# Patient Record
Sex: MALE | URBAN - METROPOLITAN AREA
[De-identification: names, ages, dates, MRNs, and addresses within clinical notes are randomized per-mention and may not be internally consistent; named-entity substitution may affect disease eponyms.]

---

## 2020-06-01 ENCOUNTER — OUTPATIENT (OUTPATIENT)
Dept: OUTPATIENT SERVICES | Facility: HOSPITAL | Age: 65
LOS: 1 days | End: 2020-06-01
Payer: MEDICAID

## 2020-06-15 PROCEDURE — G9001: CPT

## 2020-06-27 ENCOUNTER — INPATIENT (INPATIENT)
Facility: HOSPITAL | Age: 65
LOS: 1 days | Discharge: HOME | DRG: 282 | End: 2020-06-29
Attending: HOSPITALIST | Admitting: HOSPITALIST
Payer: MEDICAID

## 2020-06-27 VITALS
DIASTOLIC BLOOD PRESSURE: 80 MMHG | HEIGHT: 66 IN | SYSTOLIC BLOOD PRESSURE: 137 MMHG | OXYGEN SATURATION: 97 % | WEIGHT: 169.98 LBS | RESPIRATION RATE: 18 BRPM | HEART RATE: 57 BPM | TEMPERATURE: 98 F

## 2020-06-27 LAB
ALBUMIN SERPL ELPH-MCNC: 4.1 G/DL — SIGNIFICANT CHANGE UP (ref 3.3–5.2)
ALP SERPL-CCNC: 66 U/L — SIGNIFICANT CHANGE UP (ref 40–120)
ALT FLD-CCNC: 16 U/L — SIGNIFICANT CHANGE UP
ANION GAP SERPL CALC-SCNC: 10 MMOL/L — SIGNIFICANT CHANGE UP (ref 5–17)
AST SERPL-CCNC: 31 U/L — SIGNIFICANT CHANGE UP
BASOPHILS # BLD AUTO: 0.06 K/UL — SIGNIFICANT CHANGE UP (ref 0–0.2)
BASOPHILS NFR BLD AUTO: 0.8 % — SIGNIFICANT CHANGE UP (ref 0–2)
BILIRUB SERPL-MCNC: 0.9 MG/DL — SIGNIFICANT CHANGE UP (ref 0.4–2)
BUN SERPL-MCNC: 16 MG/DL — SIGNIFICANT CHANGE UP (ref 8–20)
CALCIUM SERPL-MCNC: 8.8 MG/DL — SIGNIFICANT CHANGE UP (ref 8.6–10.2)
CHLORIDE SERPL-SCNC: 101 MMOL/L — SIGNIFICANT CHANGE UP (ref 98–107)
CO2 SERPL-SCNC: 25 MMOL/L — SIGNIFICANT CHANGE UP (ref 22–29)
CREAT SERPL-MCNC: 0.97 MG/DL — SIGNIFICANT CHANGE UP (ref 0.5–1.3)
D DIMER BLD IA.RAPID-MCNC: 155 NG/ML DDU — SIGNIFICANT CHANGE UP
EOSINOPHIL # BLD AUTO: 1.1 K/UL — HIGH (ref 0–0.5)
EOSINOPHIL NFR BLD AUTO: 14.4 % — HIGH (ref 0–6)
GLUCOSE SERPL-MCNC: 95 MG/DL — SIGNIFICANT CHANGE UP (ref 70–99)
HCT VFR BLD CALC: 40 % — SIGNIFICANT CHANGE UP (ref 39–50)
HGB BLD-MCNC: 13.1 G/DL — SIGNIFICANT CHANGE UP (ref 13–17)
IMM GRANULOCYTES NFR BLD AUTO: 0.1 % — SIGNIFICANT CHANGE UP (ref 0–1.5)
LIDOCAIN IGE QN: 30 U/L — SIGNIFICANT CHANGE UP (ref 22–51)
LYMPHOCYTES # BLD AUTO: 2.28 K/UL — SIGNIFICANT CHANGE UP (ref 1–3.3)
LYMPHOCYTES # BLD AUTO: 29.9 % — SIGNIFICANT CHANGE UP (ref 13–44)
MAGNESIUM SERPL-MCNC: 2.5 MG/DL — SIGNIFICANT CHANGE UP (ref 1.6–2.6)
MCHC RBC-ENTMCNC: 30 PG — SIGNIFICANT CHANGE UP (ref 27–34)
MCHC RBC-ENTMCNC: 32.8 GM/DL — SIGNIFICANT CHANGE UP (ref 32–36)
MCV RBC AUTO: 91.5 FL — SIGNIFICANT CHANGE UP (ref 80–100)
MONOCYTES # BLD AUTO: 0.68 K/UL — SIGNIFICANT CHANGE UP (ref 0–0.9)
MONOCYTES NFR BLD AUTO: 8.9 % — SIGNIFICANT CHANGE UP (ref 2–14)
NEUTROPHILS # BLD AUTO: 3.5 K/UL — SIGNIFICANT CHANGE UP (ref 1.8–7.4)
NEUTROPHILS NFR BLD AUTO: 45.9 % — SIGNIFICANT CHANGE UP (ref 43–77)
NT-PROBNP SERPL-SCNC: 622 PG/ML — HIGH (ref 0–300)
PLATELET # BLD AUTO: 244 K/UL — SIGNIFICANT CHANGE UP (ref 150–400)
POTASSIUM SERPL-MCNC: 4.3 MMOL/L — SIGNIFICANT CHANGE UP (ref 3.5–5.3)
POTASSIUM SERPL-SCNC: 4.3 MMOL/L — SIGNIFICANT CHANGE UP (ref 3.5–5.3)
PROT SERPL-MCNC: 7.1 G/DL — SIGNIFICANT CHANGE UP (ref 6.6–8.7)
RBC # BLD: 4.37 M/UL — SIGNIFICANT CHANGE UP (ref 4.2–5.8)
RBC # FLD: 13.1 % — SIGNIFICANT CHANGE UP (ref 10.3–14.5)
SODIUM SERPL-SCNC: 136 MMOL/L — SIGNIFICANT CHANGE UP (ref 135–145)
TROPONIN T SERPL-MCNC: 0.07 NG/ML — HIGH (ref 0–0.06)
WBC # BLD: 7.63 K/UL — SIGNIFICANT CHANGE UP (ref 3.8–10.5)
WBC # FLD AUTO: 7.63 K/UL — SIGNIFICANT CHANGE UP (ref 3.8–10.5)

## 2020-06-27 PROCEDURE — 99285 EMERGENCY DEPT VISIT HI MDM: CPT

## 2020-06-27 PROCEDURE — 71046 X-RAY EXAM CHEST 2 VIEWS: CPT | Mod: 26

## 2020-06-27 RX ORDER — ONDANSETRON 8 MG/1
4 TABLET, FILM COATED ORAL ONCE
Refills: 0 | Status: COMPLETED | OUTPATIENT
Start: 2020-06-27 | End: 2020-06-27

## 2020-06-27 RX ORDER — CLOPIDOGREL BISULFATE 75 MG/1
300 TABLET, FILM COATED ORAL ONCE
Refills: 0 | Status: COMPLETED | OUTPATIENT
Start: 2020-06-27 | End: 2020-06-27

## 2020-06-27 RX ORDER — FAMOTIDINE 10 MG/ML
20 INJECTION INTRAVENOUS ONCE
Refills: 0 | Status: COMPLETED | OUTPATIENT
Start: 2020-06-27 | End: 2020-06-27

## 2020-06-27 RX ORDER — ASPIRIN/CALCIUM CARB/MAGNESIUM 324 MG
324 TABLET ORAL ONCE
Refills: 0 | Status: COMPLETED | OUTPATIENT
Start: 2020-06-27 | End: 2020-06-27

## 2020-06-27 RX ADMIN — Medication 324 MILLIGRAM(S): at 23:36

## 2020-06-27 RX ADMIN — ONDANSETRON 4 MILLIGRAM(S): 8 TABLET, FILM COATED ORAL at 23:35

## 2020-06-27 RX ADMIN — FAMOTIDINE 20 MILLIGRAM(S): 10 INJECTION INTRAVENOUS at 23:36

## 2020-06-27 RX ADMIN — Medication 30 MILLILITER(S): at 23:35

## 2020-06-27 RX ADMIN — CLOPIDOGREL BISULFATE 300 MILLIGRAM(S): 75 TABLET, FILM COATED ORAL at 23:55

## 2020-06-27 NOTE — ED PROVIDER NOTE - CARE PLAN
Principal Discharge DX:	NSTEMI (non-ST elevated myocardial infarction)  Secondary Diagnosis:	Duodenitis

## 2020-06-27 NOTE — ED ADULT TRIAGE NOTE - CHIEF COMPLAINT QUOTE
skylar states that patient has chest pain x 1 week with a burning feeling and body aches, no cardiac HX

## 2020-06-27 NOTE — ED PROVIDER NOTE - OBJECTIVE STATEMENT
65yom w/ no PMH has been having chest pain for the last week. Kiswahili speaking,  #[id number] used. States he has been having a burning, non-radiating pain in the chest for the past week, initially coming and going, usually after meals, but today he was having more persistent pain. Denies any associated shortness of breath or dyspnea on exertion but does report decreased appetite and early satiety. No prior cardiac history. Has been taking antacids with no relief.

## 2020-06-27 NOTE — ED PROVIDER NOTE - CLINICAL SUMMARY MEDICAL DECISION MAKING FREE TEXT BOX
EPigastric and chest pain initially suggestive of GI origin such as gastritis or PUD but found to have positive troponin consistent with NSTEMI. Cardiology consulted, given aspirin and plavix loads and started on heparin for ACS. A CT of the abdomen was performed which showed non-specific thickening of the duodenal wall, possibly neoplastic, will be followed up inpatient.

## 2020-06-28 DIAGNOSIS — I21.4 NON-ST ELEVATION (NSTEMI) MYOCARDIAL INFARCTION: ICD-10-CM

## 2020-06-28 DIAGNOSIS — E03.9 HYPOTHYROIDISM, UNSPECIFIED: ICD-10-CM

## 2020-06-28 DIAGNOSIS — K27.9 PEPTIC ULCER, SITE UNSPECIFIED, UNSPECIFIED AS ACUTE OR CHRONIC, WITHOUT HEMORRHAGE OR PERFORATION: ICD-10-CM

## 2020-06-28 LAB
A1C WITH ESTIMATED AVERAGE GLUCOSE RESULT: 5.7 % — HIGH (ref 4–5.6)
ALBUMIN SERPL ELPH-MCNC: 3.9 G/DL — SIGNIFICANT CHANGE UP (ref 3.3–5.2)
ALP SERPL-CCNC: 64 U/L — SIGNIFICANT CHANGE UP (ref 40–120)
ALT FLD-CCNC: 15 U/L — SIGNIFICANT CHANGE UP
ANION GAP SERPL CALC-SCNC: 11 MMOL/L — SIGNIFICANT CHANGE UP (ref 5–17)
APTT BLD: 107.5 SEC — HIGH (ref 27.5–36.3)
APTT BLD: 37.7 SEC — HIGH (ref 27.5–36.3)
APTT BLD: 55.6 SEC — HIGH (ref 27.5–36.3)
APTT BLD: 58.6 SEC — HIGH (ref 27.5–36.3)
AST SERPL-CCNC: 30 U/L — SIGNIFICANT CHANGE UP
BILIRUB SERPL-MCNC: 1 MG/DL — SIGNIFICANT CHANGE UP (ref 0.4–2)
BUN SERPL-MCNC: 14 MG/DL — SIGNIFICANT CHANGE UP (ref 8–20)
CALCIUM SERPL-MCNC: 9.1 MG/DL — SIGNIFICANT CHANGE UP (ref 8.6–10.2)
CHLORIDE SERPL-SCNC: 102 MMOL/L — SIGNIFICANT CHANGE UP (ref 98–107)
CHOLEST SERPL-MCNC: 128 MG/DL — SIGNIFICANT CHANGE UP (ref 110–199)
CK MB CFR SERPL CALC: 10.3 NG/ML — HIGH (ref 0–6.7)
CK SERPL-CCNC: 269 U/L — HIGH (ref 30–200)
CO2 SERPL-SCNC: 25 MMOL/L — SIGNIFICANT CHANGE UP (ref 22–29)
CREAT SERPL-MCNC: 0.95 MG/DL — SIGNIFICANT CHANGE UP (ref 0.5–1.3)
ESTIMATED AVERAGE GLUCOSE: 117 MG/DL — HIGH (ref 68–114)
GLUCOSE SERPL-MCNC: 97 MG/DL — SIGNIFICANT CHANGE UP (ref 70–99)
HCT VFR BLD CALC: 39.5 % — SIGNIFICANT CHANGE UP (ref 39–50)
HDLC SERPL-MCNC: 32 MG/DL — LOW
HGB BLD-MCNC: 13.2 G/DL — SIGNIFICANT CHANGE UP (ref 13–17)
INR BLD: 1.09 RATIO — SIGNIFICANT CHANGE UP (ref 0.88–1.16)
LIPID PNL WITH DIRECT LDL SERPL: 86 MG/DL — SIGNIFICANT CHANGE UP
MAGNESIUM SERPL-MCNC: 2.5 MG/DL — SIGNIFICANT CHANGE UP (ref 1.6–2.6)
MCHC RBC-ENTMCNC: 30.5 PG — SIGNIFICANT CHANGE UP (ref 27–34)
MCHC RBC-ENTMCNC: 33.4 GM/DL — SIGNIFICANT CHANGE UP (ref 32–36)
MCV RBC AUTO: 91.2 FL — SIGNIFICANT CHANGE UP (ref 80–100)
PHOSPHATE SERPL-MCNC: 2.8 MG/DL — SIGNIFICANT CHANGE UP (ref 2.4–4.7)
PLATELET # BLD AUTO: 216 K/UL — SIGNIFICANT CHANGE UP (ref 150–400)
POTASSIUM SERPL-MCNC: 4.2 MMOL/L — SIGNIFICANT CHANGE UP (ref 3.5–5.3)
POTASSIUM SERPL-SCNC: 4.2 MMOL/L — SIGNIFICANT CHANGE UP (ref 3.5–5.3)
PROT SERPL-MCNC: 6.7 G/DL — SIGNIFICANT CHANGE UP (ref 6.6–8.7)
PROTHROM AB SERPL-ACNC: 12.3 SEC — SIGNIFICANT CHANGE UP (ref 10–12.9)
RBC # BLD: 4.33 M/UL — SIGNIFICANT CHANGE UP (ref 4.2–5.8)
RBC # FLD: 13 % — SIGNIFICANT CHANGE UP (ref 10.3–14.5)
SARS-COV-2 IGG SERPL QL IA: NEGATIVE — SIGNIFICANT CHANGE UP
SARS-COV-2 IGM SERPL IA-ACNC: 0.11 INDEX — SIGNIFICANT CHANGE UP
SARS-COV-2 RNA SPEC QL NAA+PROBE: SIGNIFICANT CHANGE UP
SODIUM SERPL-SCNC: 138 MMOL/L — SIGNIFICANT CHANGE UP (ref 135–145)
T3FREE SERPL-MCNC: 2.88 PG/ML — SIGNIFICANT CHANGE UP (ref 1.8–4.6)
T4 FREE SERPL-MCNC: 1 NG/DL — SIGNIFICANT CHANGE UP (ref 0.9–1.8)
TOTAL CHOLESTEROL/HDL RATIO MEASUREMENT: 4 RATIO — SIGNIFICANT CHANGE UP (ref 3.4–9.6)
TRIGL SERPL-MCNC: 52 MG/DL — SIGNIFICANT CHANGE UP (ref 10–200)
TROPONIN T SERPL-MCNC: 0.07 NG/ML — HIGH (ref 0–0.06)
TROPONIN T SERPL-MCNC: 0.08 NG/ML — HIGH (ref 0–0.06)
TROPONIN T SERPL-MCNC: 0.1 NG/ML — HIGH (ref 0–0.06)
TSH SERPL-MCNC: 17.94 UIU/ML — HIGH (ref 0.27–4.2)
WBC # BLD: 6.82 K/UL — SIGNIFICANT CHANGE UP (ref 3.8–10.5)
WBC # FLD AUTO: 6.82 K/UL — SIGNIFICANT CHANGE UP (ref 3.8–10.5)

## 2020-06-28 PROCEDURE — 93010 ELECTROCARDIOGRAM REPORT: CPT | Mod: 76

## 2020-06-28 PROCEDURE — 99223 1ST HOSP IP/OBS HIGH 75: CPT

## 2020-06-28 PROCEDURE — 74177 CT ABD & PELVIS W/CONTRAST: CPT | Mod: 26

## 2020-06-28 PROCEDURE — 93306 TTE W/DOPPLER COMPLETE: CPT | Mod: 26

## 2020-06-28 PROCEDURE — 12345: CPT | Mod: NC

## 2020-06-28 RX ORDER — HEPARIN SODIUM 5000 [USP'U]/ML
4100 INJECTION INTRAVENOUS; SUBCUTANEOUS ONCE
Refills: 0 | Status: COMPLETED | OUTPATIENT
Start: 2020-06-28 | End: 2020-06-28

## 2020-06-28 RX ORDER — LISINOPRIL 2.5 MG/1
2.5 TABLET ORAL DAILY
Refills: 0 | Status: DISCONTINUED | OUTPATIENT
Start: 2020-06-28 | End: 2020-06-29

## 2020-06-28 RX ORDER — NITROGLYCERIN 6.5 MG
0.4 CAPSULE, EXTENDED RELEASE ORAL
Refills: 0 | Status: DISCONTINUED | OUTPATIENT
Start: 2020-06-28 | End: 2020-06-29

## 2020-06-28 RX ORDER — CARVEDILOL PHOSPHATE 80 MG/1
3.12 CAPSULE, EXTENDED RELEASE ORAL EVERY 12 HOURS
Refills: 0 | Status: DISCONTINUED | OUTPATIENT
Start: 2020-06-28 | End: 2020-06-29

## 2020-06-28 RX ORDER — HEPARIN SODIUM 5000 [USP'U]/ML
INJECTION INTRAVENOUS; SUBCUTANEOUS
Qty: 25000 | Refills: 0 | Status: DISCONTINUED | OUTPATIENT
Start: 2020-06-28 | End: 2020-06-29

## 2020-06-28 RX ORDER — SODIUM CHLORIDE 9 MG/ML
3 INJECTION INTRAMUSCULAR; INTRAVENOUS; SUBCUTANEOUS EVERY 8 HOURS
Refills: 0 | Status: DISCONTINUED | OUTPATIENT
Start: 2020-06-28 | End: 2020-06-29

## 2020-06-28 RX ORDER — ACETAMINOPHEN 500 MG
650 TABLET ORAL ONCE
Refills: 0 | Status: COMPLETED | OUTPATIENT
Start: 2020-06-28 | End: 2020-06-28

## 2020-06-28 RX ORDER — ASPIRIN/CALCIUM CARB/MAGNESIUM 324 MG
81 TABLET ORAL DAILY
Refills: 0 | Status: DISCONTINUED | OUTPATIENT
Start: 2020-06-28 | End: 2020-06-29

## 2020-06-28 RX ORDER — LIDOCAINE 4 G/100G
1 CREAM TOPICAL DAILY
Refills: 0 | Status: DISCONTINUED | OUTPATIENT
Start: 2020-06-28 | End: 2020-06-29

## 2020-06-28 RX ORDER — PANTOPRAZOLE SODIUM 20 MG/1
40 TABLET, DELAYED RELEASE ORAL
Refills: 0 | Status: DISCONTINUED | OUTPATIENT
Start: 2020-06-28 | End: 2020-06-29

## 2020-06-28 RX ORDER — HEPARIN SODIUM 5000 [USP'U]/ML
4100 INJECTION INTRAVENOUS; SUBCUTANEOUS EVERY 6 HOURS
Refills: 0 | Status: DISCONTINUED | OUTPATIENT
Start: 2020-06-28 | End: 2020-06-29

## 2020-06-28 RX ORDER — ONDANSETRON 8 MG/1
4 TABLET, FILM COATED ORAL EVERY 6 HOURS
Refills: 0 | Status: DISCONTINUED | OUTPATIENT
Start: 2020-06-28 | End: 2020-06-29

## 2020-06-28 RX ADMIN — SODIUM CHLORIDE 3 MILLILITER(S): 9 INJECTION INTRAMUSCULAR; INTRAVENOUS; SUBCUTANEOUS at 05:16

## 2020-06-28 RX ADMIN — HEPARIN SODIUM 0 UNIT(S)/HR: 5000 INJECTION INTRAVENOUS; SUBCUTANEOUS at 08:21

## 2020-06-28 RX ADMIN — HEPARIN SODIUM 4100 UNIT(S): 5000 INJECTION INTRAVENOUS; SUBCUTANEOUS at 00:39

## 2020-06-28 RX ADMIN — PANTOPRAZOLE SODIUM 40 MILLIGRAM(S): 20 TABLET, DELAYED RELEASE ORAL at 05:14

## 2020-06-28 RX ADMIN — HEPARIN SODIUM 600 UNIT(S)/HR: 5000 INJECTION INTRAVENOUS; SUBCUTANEOUS at 09:25

## 2020-06-28 RX ADMIN — Medication 81 MILLIGRAM(S): at 08:22

## 2020-06-28 RX ADMIN — SODIUM CHLORIDE 3 MILLILITER(S): 9 INJECTION INTRAMUSCULAR; INTRAVENOUS; SUBCUTANEOUS at 20:55

## 2020-06-28 RX ADMIN — LISINOPRIL 2.5 MILLIGRAM(S): 2.5 TABLET ORAL at 05:14

## 2020-06-28 RX ADMIN — Medication 650 MILLIGRAM(S): at 20:56

## 2020-06-28 RX ADMIN — SODIUM CHLORIDE 3 MILLILITER(S): 9 INJECTION INTRAMUSCULAR; INTRAVENOUS; SUBCUTANEOUS at 14:06

## 2020-06-28 RX ADMIN — HEPARIN SODIUM 600 UNIT(S)/HR: 5000 INJECTION INTRAVENOUS; SUBCUTANEOUS at 17:07

## 2020-06-28 RX ADMIN — Medication 1 TABLET(S): at 08:22

## 2020-06-28 RX ADMIN — HEPARIN SODIUM 600 UNIT(S)/HR: 5000 INJECTION INTRAVENOUS; SUBCUTANEOUS at 23:54

## 2020-06-28 RX ADMIN — HEPARIN SODIUM 800 UNIT(S)/HR: 5000 INJECTION INTRAVENOUS; SUBCUTANEOUS at 00:39

## 2020-06-28 RX ADMIN — CARVEDILOL PHOSPHATE 3.12 MILLIGRAM(S): 80 CAPSULE, EXTENDED RELEASE ORAL at 17:08

## 2020-06-28 NOTE — PROGRESS NOTE ADULT - ASSESSMENT
65y/oM with ACS, likely PUD, elevated TSH    NSTEMI  -on heparin gtt  -s/p asa 325, plavix 300 in ER  -cont asa  -cont coreg  -TTE pending  -cardio following  -plan for cath 6/29  -Lipid panel: Tchol 128, LDL 86, HDL 32, TG 52  -A1c 5.7%    PUD  -CT: evidence of duodenitis vs malignancy  -monitor for GIB, follow H/H   -cont PPI    Hypothyroid   -holding off on initiating synthroid until after LHC

## 2020-06-28 NOTE — H&P ADULT - NSHPSOCIALHISTORY_GEN_ALL_CORE
Ride right d/S    
Fhx: Unable to obtain at this time    Former smoker 20 pack year, quit 5 years ago  no etoh  no illicit drugs

## 2020-06-28 NOTE — CONSULT NOTE ADULT - SUBJECTIVE AND OBJECTIVE BOX
[facility]                                                               Office:  [address] [address]                                                              Telephone: [phone]. Fax:[phone]                                                                        CARDIOLOGY CONSULTATION NOTE                                                                                               History obtained by: Patient and medical record   obtained: No    Chief complaint:    Patient is a 65y old  Male who presents with a chief complaint of chest pain      HPI: 66 yo M with no significant PMH, Ex- smoker ( smoked x 20 yrs, pack lasting x 1 week, quit 5 yrs ago) who presented with chest pain since 1 mos ago, with worsening in intensity over past 2  days. Pt describes pain as burning, worse after eating, and sometimes with exertion. Pt states that pain is anterior, non radiating, non pleuritic. Pt states that pain is somewhat relieved with antacids. Pt also c/o constipation. + nausea, intermittent. + early satiety. Pt denies any CP at the present time. Pt denies any SOB/dizziness/syncope/ diaphoresis/palpitations/F/C/cough/sick contacts/recent travelling/other complaints.         REVIEW OF SYMPTOMS:     CONSTITUTIONAL: No fever, weight loss, or fatigue  ENMT:  No difficulty hearing, tinnitus, vertigo; No sinus or throat pain  NECK: No pain or stiffness  CARDIOVASCULAR: + chest pain, NO dyspnea/ syncope/palpitations/ dizziness/ Orthopnea/ Paroxysmal nocturnal dyspnea  RESPIRATORY: No Dyspnea on exertion, Shortness of breath, cough, wheezing  : No dysuria, no hematuria   GI: + intermittent nausea, + early satiety. + constipation. No dark color stool, no melena, no diarrhea, no abdominal pain   NEURO: No headache, no dizziness, no slurred speech   MUSCULOSKELETAL: No joint pain or swelling; No muscle, back, or extremity pain  PSYCH: No agitation, no anxiety.    ALL OTHER REVIEW OF SYSTEMS ARE NEGATIVE.      PREVIOUS DIAGNOSTIC TESTING: None       ALLERGIES: No Known Allergies    Intolerances: NOne    PAST MEDICAL HISTORY: None      PAST SURGICAL HISTORY: None       FAMILY HISTORY:      SOCIAL HISTORY:    CIGARETTES:  1 pack lasting 1 week x 20yrs, quit 5 yrs ago,   Denies alcohol/drugs      CURRENT MEDICATIONS: ASA, Plavix    heparin   Injectable  heparin  Infusion.        HOME MEDICATIONS:  _Maalox PRN      Vital Signs Last 24 Hrs  T(C): 36.8 (27 Jun 2020 22:05), Max: 36.8 (27 Jun 2020 22:05)  T(F): 98.2 (27 Jun 2020 22:05), Max: 98.2 (27 Jun 2020 22:05)  HR: 56 (27 Jun 2020 23:45) (56 - 57)  BP: 143/81 (27 Jun 2020 23:45) (137/80 - 143/81)  BP(mean): 107 (27 Jun 2020 23:45) (107 - 107)  RR: 16 (27 Jun 2020 23:45) (16 - 18)  SpO2: 99% (27 Jun 2020 23:45) (97% - 99%)      PHYSICAL EXAM:  Constitutional: Comfortable. No acute distress.   HEENT: Atraumatic and normocephalic, neck is supple. No JVD. No carotid bruit. PEERL   CNS: A&Ox3. No focal deficits. EOMI. Cranial nerves II-IX are intact.   Lymph Nodes: Cervical: Not palpable.  Respiratory: CTA B/L   Cardiovascular: S1S2 RRR. No murmur/rubs or gallop.  Gastrointestinal: Soft non-tender and non distended. +Bowel sounds. Negative Valle's sign.  Extremities: No edema.   Psychiatric: Calm. No agitation.  Skin: No skin rash/ulcers visualized to face, hands or feet.    Intake and output: strict-- monitor     LABS:                        13.1   7.63  )-----------( 244      ( 27 Jun 2020 22:43 )             40.0     06-27    136  |  101  |  16.0  ----------------------------<  95  4.3   |  25.0  |  0.97    Ca    8.8      27 Jun 2020 22:43  Mg     2.5     06-27    TPro  7.1  /  Alb  4.1  /  TBili  0.9  /  DBili  x   /  AST  31  /  ALT  16  /  AlkPhos  66  06-27    CARDIAC MARKERS ( 27 Jun 2020 22:43 )  x     / 0.07 ng/mL / x     / x     / x        ;p-BNP=Serum Pro-Brain Natriuretic Peptide: 622 pg/mL (06-27 @ 22:43)    PT/INR - ( 27 Jun 2020 23:49 )   PT: 12.3 sec;   INR: 1.09 ratio         PTT - ( 27 Jun 2020 23:49 )  PTT:37.7 sec      INTERPRETATION OF TELEMETRY: Reviewed by me.   ECG: Reviewed by me.     RADIOLOGY & ADDITIONAL STUDIES:    X-ray:  reviewed by me.     CT scan:   MRI: [facility]                                                               Office:  [address] [address]                                                              Telephone: [phone]. Fax:[phone]                                                                        CARDIOLOGY CONSULTATION NOTE                                                                                               History obtained by: Patient and medical record   obtained: No    Chief complaint:    Patient is a 65y old  Male who presents with a chief complaint of chest pain      HPI: 66 yo M with no significant PMH, Ex- smoker ( smoked x 20 yrs, pack lasting x 1 week, quit 5 yrs ago) who presented with chest pain since 1 mos ago, with worsening in intensity over past 2  days. Pt describes pain as burning, worse after eating, and sometimes with exertion. Pt states that pain is anterior, non radiating, non pleuritic. Pt states that pain is somewhat relieved with antacids. Pt also c/o constipation. + nausea, intermittent. + early satiety. Pt denies any CP at the present time. Pt denies any SOB/dizziness/syncope/ diaphoresis/palpitations/F/C/cough/sick contacts/recent travelling/other complaints.         REVIEW OF SYMPTOMS:     CONSTITUTIONAL: No fever, weight loss, or fatigue  ENMT:  No difficulty hearing, tinnitus, vertigo; No sinus or throat pain  NECK: No pain or stiffness  CARDIOVASCULAR: + chest pain, NO dyspnea/ syncope/palpitations/ dizziness/ Orthopnea/ Paroxysmal nocturnal dyspnea  RESPIRATORY: No Dyspnea on exertion, Shortness of breath, cough, wheezing  : No dysuria, no hematuria   GI: + intermittent nausea, + early satiety. + constipation. No dark color stool, no melena, no diarrhea, no abdominal pain   NEURO: No headache, no dizziness, no slurred speech   MUSCULOSKELETAL: No joint pain or swelling; No muscle, back, or extremity pain  PSYCH: No agitation, no anxiety.    ALL OTHER REVIEW OF SYSTEMS ARE NEGATIVE.      PREVIOUS DIAGNOSTIC TESTING: None       ALLERGIES: No Known Allergies    Intolerances: NOne    PAST MEDICAL HISTORY: None      PAST SURGICAL HISTORY: None       FAMILY HISTORY:      SOCIAL HISTORY:    CIGARETTES:  1 pack lasting 1 week x 20yrs, quit 5 yrs ago,   Denies alcohol/drugs      CURRENT MEDICATIONS: ASA, Plavix    heparin   Injectable  heparin  Infusion.        HOME MEDICATIONS:  _Maalox PRN      Vital Signs Last 24 Hrs  T(C): 36.8 (27 Jun 2020 22:05), Max: 36.8 (27 Jun 2020 22:05)  T(F): 98.2 (27 Jun 2020 22:05), Max: 98.2 (27 Jun 2020 22:05)  HR: 56 (27 Jun 2020 23:45) (56 - 57)  BP: 143/81 (27 Jun 2020 23:45) (137/80 - 143/81)  BP(mean): 107 (27 Jun 2020 23:45) (107 - 107)  RR: 16 (27 Jun 2020 23:45) (16 - 18)  SpO2: 99% (27 Jun 2020 23:45) (97% - 99%)      PHYSICAL EXAM:  Constitutional: Comfortable. No acute distress.   HEENT: Atraumatic and normocephalic, neck is supple. No JVD. No carotid bruit. PEERL   CNS: A&Ox3. No focal deficits. EOMI. Cranial nerves II-IX are intact.   Lymph Nodes: Cervical: Not palpable.  Respiratory: CTA B/L   Cardiovascular: S1S2 RRR. No murmur/rubs or gallop.  Gastrointestinal: Soft non-tender and non distended. +Bowel sounds. Negative Valle's sign.  Extremities: No edema.   Psychiatric: Calm. No agitation.  Skin: No skin rash/ulcers visualized to face, hands or feet.    Intake and output: strict-- monitor     LABS:                        13.1   7.63  )-----------( 244      ( 27 Jun 2020 22:43 )             40.0     06-27    136  |  101  |  16.0  ----------------------------<  95  4.3   |  25.0  |  0.97    Ca    8.8      27 Jun 2020 22:43  Mg     2.5     06-27    TPro  7.1  /  Alb  4.1  /  TBili  0.9  /  DBili  x   /  AST  31  /  ALT  16  /  AlkPhos  66  06-27    CARDIAC MARKERS ( 27 Jun 2020 22:43 )  x     / 0.07 ng/mL / x     / x     / x        ;p-BNP=Serum Pro-Brain Natriuretic Peptide: 622 pg/mL (06-27 @ 22:43)    PT/INR - ( 27 Jun 2020 23:49 )   PT: 12.3 sec;   INR: 1.09 ratio         PTT - ( 27 Jun 2020 23:49 )  PTT:37.7 sec      INTERPRETATION OF TELEMETRY: Reviewed by me. -- sinus bradycardia   ECG: Reviewed by me. -- sinus perla at 57 bpm, TWI- AVR, III, V1, V2, early repolarization abnormality- AVL, QT's- 402     RADIOLOGY & ADDITIONAL STUDIES:    X-ray:  reviewed by me.     CT scan:   MRI: [place] [facility]                                                               Office:  [address] [address]                                                              Telephone: [phone]. Fax:[phone]                                                                        CARDIOLOGY CONSULTATION NOTE                                                                                               History obtained by: Patient and medical record   obtained: No    Chief complaint:    Patient is a 65y old  Male who presents with a chief complaint of chest pain      HPI: 64 yo M with no significant PMH, Ex- smoker ( smoked x 20 yrs, pack lasting x 1 week, quit 5 yrs ago) who presented with chest pain since 1 mos ago, with worsening in intensity over past 2  days. Pt describes pain as burning, worse after eating, and sometimes with exertion. Pt states that pain is anterior, non radiating, non pleuritic. Pt states that pain is somewhat relieved with antacids. Pt also c/o constipation. + nausea, intermittent. + early satiety. Pt denies any CP at the present time. Pt denies any SOB/dizziness/syncope/ diaphoresis/palpitations/F/C/cough/sick contacts/recent travelling/other complaints.         REVIEW OF SYMPTOMS:     CONSTITUTIONAL: No fever, weight loss, or fatigue  ENMT:  No difficulty hearing, tinnitus, vertigo; No sinus or throat pain  NECK: No pain or stiffness  CARDIOVASCULAR: + chest pain, NO dyspnea/ syncope/palpitations/ dizziness/ Orthopnea/ Paroxysmal nocturnal dyspnea  RESPIRATORY: No Dyspnea on exertion, Shortness of breath, cough, wheezing  : No dysuria, no hematuria   GI: + intermittent nausea, + early satiety. + constipation. No dark color stool, no melena, no diarrhea, no abdominal pain   NEURO: No headache, no dizziness, no slurred speech   MUSCULOSKELETAL: No joint pain or swelling; No muscle, back, or extremity pain  PSYCH: No agitation, no anxiety.    ALL OTHER REVIEW OF SYSTEMS ARE NEGATIVE.      PREVIOUS DIAGNOSTIC TESTING: None       ALLERGIES: No Known Allergies    Intolerances: NOne    PAST MEDICAL HISTORY: None      PAST SURGICAL HISTORY: None       FAMILY HISTORY:      SOCIAL HISTORY:    CIGARETTES:  1 pack lasting 1 week x 20yrs, quit 5 yrs ago,   Denies alcohol/drugs      CURRENT MEDICATIONS: ASA, Plavix    heparin   Injectable  heparin  Infusion.        HOME MEDICATIONS:  _Maalox PRN      Vital Signs Last 24 Hrs  T(C): 36.8 (27 Jun 2020 22:05), Max: 36.8 (27 Jun 2020 22:05)  T(F): 98.2 (27 Jun 2020 22:05), Max: 98.2 (27 Jun 2020 22:05)  HR: 56 (27 Jun 2020 23:45) (56 - 57)  BP: 143/81 (27 Jun 2020 23:45) (137/80 - 143/81)  BP(mean): 107 (27 Jun 2020 23:45) (107 - 107)  RR: 16 (27 Jun 2020 23:45) (16 - 18)  SpO2: 99% (27 Jun 2020 23:45) (97% - 99%)      PHYSICAL EXAM:  Constitutional: Comfortable. No acute distress.   HEENT: Atraumatic and normocephalic, neck is supple. No JVD. No carotid bruit. PEERL   CNS: A&Ox3. No focal deficits. EOMI. Cranial nerves II-IX are intact.   Lymph Nodes: Cervical: Not palpable.  Respiratory: CTA B/L   Cardiovascular: S1S2 RRR. No murmur/rubs or gallop.  Gastrointestinal: Soft non-tender and non distended. +Bowel sounds. Negative Valle's sign.  Extremities: No edema.   Psychiatric: Calm. No agitation.  Skin: No skin rash/ulcers visualized to face, hands or feet.    Intake and output: strict-- monitor     LABS:                        13.1   7.63  )-----------( 244      ( 27 Jun 2020 22:43 )             40.0     06-27    136  |  101  |  16.0  ----------------------------<  95  4.3   |  25.0  |  0.97    Ca    8.8      27 Jun 2020 22:43  Mg     2.5     06-27    TPro  7.1  /  Alb  4.1  /  TBili  0.9  /  DBili  x   /  AST  31  /  ALT  16  /  AlkPhos  66  06-27    CARDIAC MARKERS ( 27 Jun 2020 22:43 )  x     / 0.07 ng/mL / x     / x     / x        ;p-BNP=Serum Pro-Brain Natriuretic Peptide: 622 pg/mL (06-27 @ 22:43)    PT/INR - ( 27 Jun 2020 23:49 )   PT: 12.3 sec;   INR: 1.09 ratio         PTT - ( 27 Jun 2020 23:49 )  PTT:37.7 sec      INTERPRETATION OF TELEMETRY: Reviewed by me. -- sinus bradycardia   ECG: Reviewed by me. -- sinus perla at 57 bpm, TWI- AVR, III, V1, V2, early repolarization abnormality- AVL, QT's- 402     RADIOLOGY & ADDITIONAL STUDIES:    X-ray:  reviewed by me.     TTE-- ordered, pending    CT scan a/p -- Apparent wall thickening of the duodenal second portion. Recommend endoscopy to exclude inflammatory or neoplastic etiology.

## 2020-06-28 NOTE — CONSULT NOTE ADULT - ASSESSMENT
64 yo M with no significant PMH, Ex- smoker ( smoked x 20 yrs, pack lasting x 1 week, quit 5 yrs ago) who presented with chest pain since 1 mos ago, with worsening in intensity over past 2  days. 66 yo M with no significant PMH, Ex- smoker ( smoked x 20 yrs, pack lasting x 1 week, quit 5 yrs ago) who presented with chest pain since 1 mos ago, with worsening in intensity over past 2  days.    #Chest pain, R/o ACS  - Trop-- 0.07. >> serial CE - trend until peak, with CK-- next one ordered for 02:45 am,   -- ProBNP- 622,   --ECG - sinus perla 57 with TWI- AVR, III, V1 and V x 2 in ER (no prior ECG to compare with >> serial ECG's   -- tele monitoring,   -- TTE, pt will need further ischemic evaluation, possibly NST inpt versus outpt,   --  mg x 1 followed by 81 mg daily and Plavix 300 mg x 1 followed by 75 mg daily, Heparin gtt with bolus as per ACS nomogram,   -- bedrest x 24 hrs,   -- A1C, FLP, TSH,   -- CT a/p with oral contrast to r/o abdominal abnormalities     #COVID PCR-- negative, low suspicion for COVID,    #Ex-smoker    #Dispo/social issues:  -- to the tele floor  -- pt will need outpt f/u with PCP and possibly cardiology. Pt arrived from [facility] 8 mos ago and has not been following with PCP 66 yo M with no significant PMH, Ex- smoker ( smoked x 20 yrs, pack lasting x 1 week, quit 5 yrs ago) who presented with chest pain since 1 mos ago, with worsening in intensity over past 2  days.    #Chest pain, R/o ACS  - Trop-- 0.07. >> serial CE - trend until peak, with CK-- next one ordered for 02:45 am,   -- ProBNP- 622,   --ECG - sinus perla 57 with TWI- AVR, III, V1 and V x 2 in ER (no prior ECG to compare with >> serial ECG's   -- tele monitoring,   -- TTE to R/O structural heart dz and evaluate EF, pt will need further ischemic evaluation, possibly NST inpt versus outpt,   --  mg x 1 followed by 81 mg daily and Plavix 300 mg x 1 followed by 75 mg daily, Heparin gtt with bolus as per ACS nomogram,   -- bedrest x 24 hrs,   -- BP is mildly elevated, -- will start on low dose of Coreg for early treatment and secondary prevention,   -- A1C, FLP, TSH,   -- CT a/p with oral contrast to r/o abdominal abnormalities     #COVID PCR-- negative, low suspicion for COVID,    #Ex-smoker    #Dispo/social issues:  -- to the tele floor  -- pt will need outpt f/u with PCP and possibly cardiology. Pt arrived from [facility] 8 mos ago and has not been following with PCP 66 yo M with no significant PMH, Ex- smoker ( smoked x 20 yrs, pack lasting x 1 week, quit 5 yrs ago) who presented with chest pain since 1 mos ago, with worsening in intensity over past 2  days.    #Chest pain, R/o ACS  - Trop-- 0.07. >> serial CE - trend until peak, with CK-- next one ordered for 02:45 am,   -- ProBNP- 622,   --ECG - sinus perla 57 with TWI- AVR, III, V1 and V x 2 in ER (no prior ECG to compare with >> serial ECG's   -- tele monitoring,   -- TTE to R/O structural heart dz and evaluate EF, pt will need further ischemic evaluation, possibly NST inpt versus outpt,   --  mg x 1 followed by 81 mg daily and Plavix 300 mg x 1 followed by 75 mg daily, Heparin gtt with bolus as per ACS nomogram,   -- bedrest x 24 hrs,   -- BP is mildly elevated, -- started on low dose of Coreg for early treatment and secondary prevention,   -- A1C, FLP, TSH,   -- CT a/p with oral contrast to r/o abdominal abnormalities     #COVID PCR-- negative, low suspicion for COVID,    #Ex-smoker    #Dispo/social issues:  -- to the tele floor  -- pt will need outpt f/u with PCP and possibly cardiology. Pt arrived from [facility] 8 mos ago and has not been following with PCP 64 yo M with no significant PMH, Ex- smoker ( smoked x 20 yrs, pack lasting x 1 week, quit 5 yrs ago) who presented with chest pain since 1 mos ago, with worsening in intensity over past 2  days.    #Chest pain, R/o ACS  - Trop-- 0.07. >> serial CE - trend until peak, with CK-- next one ordered for 02:45 am,   -- ProBNP- 622,   --ECG - sinus perla 57 with TWI- AVR, III, V1 and V x 2 in ER (no prior ECG to compare with >> serial ECG's   -- tele monitoring,   -- TTE to R/O structural heart dz and evaluate EF, pt will need further ischemic evaluation, possibly NST inpt versus outpt,   --  mg x 1 followed by 81 mg daily and Plavix 300 mg x 1 followed by 75 mg daily, Heparin gtt with bolus as per ACS nomogram,   -- bedrest x 24 hrs,   -- BP is mildly elevated, -- started on low dose of Coreg for early treatment and secondary prevention,   -- A1C, FLP, TSH,     #Anterior chest pain with early satiety, r/o referred pain from the abdomen  -- CT a/p with oral contrast to r/o abdominal abnormalities --Apparent wall thickening of the duodenal second portion. Recommend endoscopy to exclude inflammatory or neoplastic etiology.  -- GI consult is recommended,     #COVID PCR-- negative, low suspicion for COVID,    #Ex-smoker    #Dispo/social issues:  -- to the tele floor  -- pt will need outpt f/u with PCP and possibly cardiology. Pt arrived from [facility] 8 mos ago and has not been following with PCP 66 yo M with no significant PMH, Ex- smoker ( smoked x 20 yrs, pack lasting x 1 week, quit 5 yrs ago) who presented with chest pain since 1 mos ago, with worsening in intensity over past 2  days.    #Chest pain, R/o ACS  - Trop-- 0.07. >> 0.07, CK- 269, CKMB- 10.3, >> serial CE -> trend until peak, next CE at 06:45 am,   -- ProBNP- 622,   --ECG - sinus perla 57 with TWI- AVR, III, V1 and V x 2 in ER (no prior ECG to compare with >> serial ECG's   -- tele monitoring,   -- TTE to R/O structural heart dz and evaluate EF, pt will need further ischemic evaluation, possibly NST inpt versus outpt,   --  mg x 1 followed by 81 mg daily and Plavix 300 mg x 1 followed by 75 mg daily, Heparin gtt with bolus as per ACS nomogram,   -- bedrest x 24 hrs,   -- BP is mildly elevated, -- started on low dose of Coreg for early treatment and secondary prevention,   -- A1C, FLP, TSH,     #Anterior chest pain with early satiety, r/o referred pain from the abdomen  -- CT a/p with oral contrast to r/o abdominal abnormalities --Apparent wall thickening of the duodenal second portion. Recommend endoscopy to exclude inflammatory or neoplastic etiology.  -- GI consult is recommended,     #COVID PCR-- negative, low suspicion for COVID,    #Ex-smoker    #Dispo/social issues:  -- to the tele floor  -- pt will need outpt f/u with PCP and possibly cardiology. Pt arrived from [facility] 8 mos ago and has not been following with PCP

## 2020-06-28 NOTE — H&P ADULT - PROBLEM SELECTOR PLAN 1
Admit to tele, cont. Aspirin, heparin, add statin, low dose Coreg as HR will tolerate, add low dose ACE-I, serial ekg/trop. check echo. Bradycardia likely from newly diagnosed hypothyroidism. Lipid panel/A1C as above, Cardiac diet, prn SL nitro, OOB, Ambulate. Likely LHC on this admission.

## 2020-06-28 NOTE — H&P ADULT - PROBLEM SELECTOR PLAN 3
TSH >10, bradycardia indication for treatment, however with ACS will hold off starting on thyroid replacement as can increase HR and cause supply/demand mismatch. Await replacement until after [facility]. Check T4

## 2020-06-28 NOTE — H&P ADULT - PROBLEM SELECTOR PLAN 2
HPI c/w PUD, CT with evidence of duodenitis vs malignancy? Consider inpatient vs o/p EGD once ACS resolved. Monitor for GIB while on aspirin and Heparin, check torsten cbc, add PPI

## 2020-06-28 NOTE — H&P ADULT - HISTORY OF PRESENT ILLNESS
66 y/o male brought in by family for on and off CP over the past 3 weeks. Apparently his pain has gotten worse over the past couple days. He has no established care here and came from [facility] 8 months ago. Reported no medical history, on no prescription medications. No fevers, chills, SOB. No sick contacts. His pain seems to be more related to food ingestion, no reported diaphoresis or SOB. Alleviating factors are antacids. He has occasional nausea, but no vomiting, no dark stool or BRBPR. No leg swelling or orthopnea. In the ED EKG with bradycardia and non-specific ST changes with no prior for comparison. Initial Trop. .07 w/ elevated CKMB. Vitals stable. CXR clear. Seen by Cardiology who advised plavix/aspirin, and start on Heparin drip for ACS. Currently CP free.

## 2020-06-28 NOTE — PROGRESS NOTE ADULT - SUBJECTIVE AND OBJECTIVE BOX
KATHLEEN NICOLE    [id number]    65y      Male    CC: Chest pain    INTERVAL HPI/OVERNIGHT EVENTS: Pt seen and examined. admitted this am with NSTEMI.     REVIEW OF SYSTEMS:    CONSTITUTIONAL: No fever, weight loss, or fatigue  RESPIRATORY: No cough, wheezing, hemoptysis; No shortness of breath  CARDIOVASCULAR: No palpitations  GASTROINTESTINAL: No abdominal or epigastric pain. No nausea, vomiting  NEUROLOGICAL: No headaches, memory loss, loss of strength.    Vital Signs Last 24 Hrs  T(C): 36.3 (28 Jun 2020 09:44), Max: 37.1 (28 Jun 2020 04:45)  T(F): 97.3 (28 Jun 2020 09:44), Max: 98.7 (28 Jun 2020 04:45)  HR: 58 (28 Jun 2020 09:44) (52 - 58)  BP: 108/69 (28 Jun 2020 09:44) (108/69 - 148/78)  BP(mean): 97 (28 Jun 2020 04:16) (97 - 107)  RR: 16 (28 Jun 2020 09:44) (14 - 18)  SpO2: 97% (28 Jun 2020 09:44) (97% - 99%)    PHYSICAL EXAM:    GENERAL: NAD  HEENT: PERRL, +EOMI  NECK: soft, supple  CHEST/LUNG: Clear to auscultation bilaterally; No wheezing  HEART: S1S2+, bradycardic  ABDOMEN: Soft, Nondistended; Bowel sounds present  SKIN: No rashes or lesions  NEURO: AAOX3, no focal deficits    LABS:                        13.2   6.82  )-----------( 216      ( 28 Jun 2020 06:42 )             39.5     06-28    138  |  102  |  14.0  ----------------------------<  97  4.2   |  25.0  |  0.95    Ca    9.1      28 Jun 2020 06:42  Phos  2.8     06-28  Mg     2.5     06-28    TPro  6.7  /  Alb  3.9  /  TBili  1.0  /  DBili  x   /  AST  30  /  ALT  15  /  AlkPhos  64  06-28    PT/INR - ( 27 Jun 2020 23:49 )   PT: 12.3 sec;   INR: 1.09 ratio         PTT - ( 28 Jun 2020 06:43 )  PTT:107.5 sec        MEDICATIONS  (STANDING):  aspirin  chewable 81 milliGRAM(s) Oral daily  carvedilol 3.125 milliGRAM(s) Oral every 12 hours  heparin  Infusion.  Unit(s)/Hr (8 mL/Hr) IV Continuous <Continuous>  lidocaine   Patch 1 Patch Transdermal daily  lisinopril 2.5 milliGRAM(s) Oral daily  multivitamin 1 Tablet(s) Oral daily  pantoprazole    Tablet 40 milliGRAM(s) Oral before breakfast  sodium chloride 0.9% lock flush 3 milliLiter(s) IV Push every 8 hours    MEDICATIONS  (PRN):  heparin   Injectable 4100 Unit(s) IV Push every 6 hours PRN For aPTT less than 40  nitroglycerin     SubLingual 0.4 milliGRAM(s) SubLingual every 5 minutes PRN Chest Pain  ondansetron Injectable 4 milliGRAM(s) IV Push every 6 hours PRN Nausea      RADIOLOGY & ADDITIONAL TESTS:

## 2020-06-28 NOTE — CONSULT NOTE ADULT - ATTENDING COMMENTS
Non-ST elevation Myocardial Infarction : aspirin. loaded with plavixc.    prelim echo: Subtle segmental wall motion in RCA territory otherwise normakl EF. No significant valvular abnormality.   plan for cath tomorrow  aspirin.s tatins. beta-blocker telelmetry anticoagulation with heparin.

## 2020-06-29 ENCOUNTER — TRANSCRIPTION ENCOUNTER (OUTPATIENT)
Age: 65
End: 2020-06-29

## 2020-06-29 VITALS — HEART RATE: 81 BPM | TEMPERATURE: 97 F

## 2020-06-29 LAB
ANION GAP SERPL CALC-SCNC: 12 MMOL/L — SIGNIFICANT CHANGE UP (ref 5–17)
APTT BLD: 53.6 SEC — HIGH (ref 27.5–36.3)
BASOPHILS # BLD AUTO: 0.05 K/UL — SIGNIFICANT CHANGE UP (ref 0–0.2)
BASOPHILS NFR BLD AUTO: 0.8 % — SIGNIFICANT CHANGE UP (ref 0–2)
BUN SERPL-MCNC: 17 MG/DL — SIGNIFICANT CHANGE UP (ref 8–20)
CALCIUM SERPL-MCNC: 9 MG/DL — SIGNIFICANT CHANGE UP (ref 8.6–10.2)
CHLORIDE SERPL-SCNC: 102 MMOL/L — SIGNIFICANT CHANGE UP (ref 98–107)
CK MB CFR SERPL CALC: 6.1 NG/ML — SIGNIFICANT CHANGE UP (ref 0–6.7)
CK SERPL-CCNC: 214 U/L — HIGH (ref 30–200)
CO2 SERPL-SCNC: 24 MMOL/L — SIGNIFICANT CHANGE UP (ref 22–29)
CREAT SERPL-MCNC: 1.05 MG/DL — SIGNIFICANT CHANGE UP (ref 0.5–1.3)
EOSINOPHIL # BLD AUTO: 1.09 K/UL — HIGH (ref 0–0.5)
EOSINOPHIL NFR BLD AUTO: 16.7 % — HIGH (ref 0–6)
GLUCOSE SERPL-MCNC: 101 MG/DL — HIGH (ref 70–99)
HCT VFR BLD CALC: 40.7 % — SIGNIFICANT CHANGE UP (ref 39–50)
HCV AB S/CO SERPL IA: 0.14 S/CO — SIGNIFICANT CHANGE UP (ref 0–0.99)
HCV AB SERPL-IMP: SIGNIFICANT CHANGE UP
HGB BLD-MCNC: 13.3 G/DL — SIGNIFICANT CHANGE UP (ref 13–17)
IMM GRANULOCYTES NFR BLD AUTO: 0.3 % — SIGNIFICANT CHANGE UP (ref 0–1.5)
LYMPHOCYTES # BLD AUTO: 1.57 K/UL — SIGNIFICANT CHANGE UP (ref 1–3.3)
LYMPHOCYTES # BLD AUTO: 24 % — SIGNIFICANT CHANGE UP (ref 13–44)
MAGNESIUM SERPL-MCNC: 2.4 MG/DL — SIGNIFICANT CHANGE UP (ref 1.6–2.6)
MCHC RBC-ENTMCNC: 30 PG — SIGNIFICANT CHANGE UP (ref 27–34)
MCHC RBC-ENTMCNC: 32.7 GM/DL — SIGNIFICANT CHANGE UP (ref 32–36)
MCV RBC AUTO: 91.7 FL — SIGNIFICANT CHANGE UP (ref 80–100)
MONOCYTES # BLD AUTO: 0.55 K/UL — SIGNIFICANT CHANGE UP (ref 0–0.9)
MONOCYTES NFR BLD AUTO: 8.4 % — SIGNIFICANT CHANGE UP (ref 2–14)
NEUTROPHILS # BLD AUTO: 3.26 K/UL — SIGNIFICANT CHANGE UP (ref 1.8–7.4)
NEUTROPHILS NFR BLD AUTO: 49.8 % — SIGNIFICANT CHANGE UP (ref 43–77)
PLATELET # BLD AUTO: 230 K/UL — SIGNIFICANT CHANGE UP (ref 150–400)
POTASSIUM SERPL-MCNC: 4.1 MMOL/L — SIGNIFICANT CHANGE UP (ref 3.5–5.3)
POTASSIUM SERPL-SCNC: 4.1 MMOL/L — SIGNIFICANT CHANGE UP (ref 3.5–5.3)
RBC # BLD: 4.44 M/UL — SIGNIFICANT CHANGE UP (ref 4.2–5.8)
RBC # FLD: 13.2 % — SIGNIFICANT CHANGE UP (ref 10.3–14.5)
SODIUM SERPL-SCNC: 138 MMOL/L — SIGNIFICANT CHANGE UP (ref 135–145)
T4 AB SER-ACNC: 6.1 UG/DL — SIGNIFICANT CHANGE UP (ref 4.5–12)
WBC # BLD: 6.54 K/UL — SIGNIFICANT CHANGE UP (ref 3.8–10.5)
WBC # FLD AUTO: 6.54 K/UL — SIGNIFICANT CHANGE UP (ref 3.8–10.5)

## 2020-06-29 PROCEDURE — 84481 FREE ASSAY (FT-3): CPT

## 2020-06-29 PROCEDURE — 87635 SARS-COV-2 COVID-19 AMP PRB: CPT

## 2020-06-29 PROCEDURE — 85730 THROMBOPLASTIN TIME PARTIAL: CPT

## 2020-06-29 PROCEDURE — 93010 ELECTROCARDIOGRAM REPORT: CPT

## 2020-06-29 PROCEDURE — 71046 X-RAY EXAM CHEST 2 VIEWS: CPT

## 2020-06-29 PROCEDURE — 85610 PROTHROMBIN TIME: CPT

## 2020-06-29 PROCEDURE — 80048 BASIC METABOLIC PNL TOTAL CA: CPT

## 2020-06-29 PROCEDURE — 84439 ASSAY OF FREE THYROXINE: CPT

## 2020-06-29 PROCEDURE — 99233 SBSQ HOSP IP/OBS HIGH 50: CPT

## 2020-06-29 PROCEDURE — 84484 ASSAY OF TROPONIN QUANT: CPT

## 2020-06-29 PROCEDURE — 36415 COLL VENOUS BLD VENIPUNCTURE: CPT

## 2020-06-29 PROCEDURE — 99239 HOSP IP/OBS DSCHRG MGMT >30: CPT

## 2020-06-29 PROCEDURE — 82550 ASSAY OF CK (CPK): CPT

## 2020-06-29 PROCEDURE — 82553 CREATINE MB FRACTION: CPT

## 2020-06-29 PROCEDURE — 86769 SARS-COV-2 COVID-19 ANTIBODY: CPT

## 2020-06-29 PROCEDURE — 86803 HEPATITIS C AB TEST: CPT

## 2020-06-29 PROCEDURE — 83036 HEMOGLOBIN GLYCOSYLATED A1C: CPT

## 2020-06-29 PROCEDURE — 74177 CT ABD & PELVIS W/CONTRAST: CPT

## 2020-06-29 PROCEDURE — 83880 ASSAY OF NATRIURETIC PEPTIDE: CPT

## 2020-06-29 PROCEDURE — 93005 ELECTROCARDIOGRAM TRACING: CPT

## 2020-06-29 PROCEDURE — 84443 ASSAY THYROID STIM HORMONE: CPT

## 2020-06-29 PROCEDURE — 80061 LIPID PANEL: CPT

## 2020-06-29 PROCEDURE — 96374 THER/PROPH/DIAG INJ IV PUSH: CPT | Mod: XU

## 2020-06-29 PROCEDURE — 83690 ASSAY OF LIPASE: CPT

## 2020-06-29 PROCEDURE — 85379 FIBRIN DEGRADATION QUANT: CPT

## 2020-06-29 PROCEDURE — 99285 EMERGENCY DEPT VISIT HI MDM: CPT | Mod: 25

## 2020-06-29 PROCEDURE — 83735 ASSAY OF MAGNESIUM: CPT

## 2020-06-29 PROCEDURE — 80053 COMPREHEN METABOLIC PANEL: CPT

## 2020-06-29 PROCEDURE — 93307 TTE W/O DOPPLER COMPLETE: CPT

## 2020-06-29 PROCEDURE — 96375 TX/PRO/DX INJ NEW DRUG ADDON: CPT

## 2020-06-29 PROCEDURE — 84100 ASSAY OF PHOSPHORUS: CPT

## 2020-06-29 PROCEDURE — 84436 ASSAY OF TOTAL THYROXINE: CPT

## 2020-06-29 PROCEDURE — 85027 COMPLETE CBC AUTOMATED: CPT

## 2020-06-29 RX ORDER — ATORVASTATIN CALCIUM 80 MG/1
1 TABLET, FILM COATED ORAL
Qty: 30 | Refills: 0
Start: 2020-06-29 | End: 2020-07-28

## 2020-06-29 RX ORDER — CLOPIDOGREL BISULFATE 75 MG/1
75 TABLET, FILM COATED ORAL DAILY
Refills: 0 | Status: DISCONTINUED | OUTPATIENT
Start: 2020-06-29 | End: 2020-06-29

## 2020-06-29 RX ORDER — CARVEDILOL PHOSPHATE 80 MG/1
1 CAPSULE, EXTENDED RELEASE ORAL
Qty: 60 | Refills: 0
Start: 2020-06-29 | End: 2020-07-28

## 2020-06-29 RX ORDER — CLOPIDOGREL BISULFATE 75 MG/1
1 TABLET, FILM COATED ORAL
Qty: 30 | Refills: 0
Start: 2020-06-29 | End: 2020-07-28

## 2020-06-29 RX ORDER — LISINOPRIL 2.5 MG/1
1 TABLET ORAL
Qty: 30 | Refills: 0
Start: 2020-06-29 | End: 2020-07-28

## 2020-06-29 RX ORDER — PANTOPRAZOLE SODIUM 20 MG/1
1 TABLET, DELAYED RELEASE ORAL
Qty: 30 | Refills: 0
Start: 2020-06-29 | End: 2020-07-28

## 2020-06-29 RX ORDER — ASPIRIN/CALCIUM CARB/MAGNESIUM 324 MG
1 TABLET ORAL
Qty: 30 | Refills: 0
Start: 2020-06-29 | End: 2020-07-28

## 2020-06-29 RX ADMIN — SODIUM CHLORIDE 3 MILLILITER(S): 9 INJECTION INTRAMUSCULAR; INTRAVENOUS; SUBCUTANEOUS at 05:24

## 2020-06-29 RX ADMIN — SODIUM CHLORIDE 3 MILLILITER(S): 9 INJECTION INTRAMUSCULAR; INTRAVENOUS; SUBCUTANEOUS at 13:11

## 2020-06-29 RX ADMIN — CARVEDILOL PHOSPHATE 3.12 MILLIGRAM(S): 80 CAPSULE, EXTENDED RELEASE ORAL at 05:23

## 2020-06-29 RX ADMIN — Medication 81 MILLIGRAM(S): at 08:18

## 2020-06-29 RX ADMIN — Medication 1 TABLET(S): at 08:18

## 2020-06-29 RX ADMIN — HEPARIN SODIUM 600 UNIT(S)/HR: 5000 INJECTION INTRAVENOUS; SUBCUTANEOUS at 07:22

## 2020-06-29 RX ADMIN — LIDOCAINE 1 PATCH: 4 CREAM TOPICAL at 08:19

## 2020-06-29 RX ADMIN — CARVEDILOL PHOSPHATE 3.12 MILLIGRAM(S): 80 CAPSULE, EXTENDED RELEASE ORAL at 17:09

## 2020-06-29 RX ADMIN — PANTOPRAZOLE SODIUM 40 MILLIGRAM(S): 20 TABLET, DELAYED RELEASE ORAL at 05:23

## 2020-06-29 NOTE — DISCHARGE NOTE PROVIDER - PROVIDER TOKENS
PROVIDER:[TOKEN:[[id number]],FOLLOWUP:[1 week]],PROVIDER:[TOKEN:[[id number]],FOLLOWUP:[1 week]],PROVIDER:[TOKEN:[[id number]]]

## 2020-06-29 NOTE — PROGRESS NOTE ADULT - SUBJECTIVE AND OBJECTIVE BOX
Department of Cardiology                                                                  [facility]/[place] [address] [address]                                                            Telephone: [phone]. Fax:[phone]                                                                                   Pre Cath Note    65y Male     Planned Procedure: [facility]    Pertinent Prior Medications:        Antiplatelet: N/A       Aspirin: 81 mg daily       Statin: N/A       Beta Blocker: Coreg 3.125 mg BID       CCB: N/A       Other Antianginal: N/A       ACEI: Lisinopril 2.5 mg daily    ASA: 3  Mallampati: 2  CCS Class: 4  GFR: 74  Adjusted Bleeding Risk: 1.0%    HPI: 64 yo M with no significant PMH, Ex- smoker ( smoked x 20 yrs, pack lasting x 1 week, quit 5 yrs ago) who presented with chest pain since 1 mos ago, with worsening in intensity over past 2  days. Pt describes pain as burning, worse after eating, and sometimes with exertion. Pt states that pain is anterior, non radiating, non pleuritic. Pt states that pain is somewhat relieved with antacids. Pt also c/o constipation. + nausea, intermittent. + early satiety. Pt denies any CP at the present time. Pt denies any SOB/dizziness/syncope/ diaphoresis/palpitations/F/C/cough/sick contacts/recent travelling/other complaints.    Nuclear Stress Test: N/A    Echo:   Left Ventricle: The left ventricular internal cavity size is normal. Left ventricular wall thickness is mildly increased. Global LV systolic function was normal. Left ventricular ejection fraction, by visual estimation, is 65 to 70%. Spectral Doppler shows normal pattern of LV diastolic filling.  LV Wall Scoring: The basal and mid inferior wall and basal inferoseptal segment are hypokinetic.  Right Ventricle: Normal right ventricular size and function.  Left Atrium: Normal left atrial size.  Right Atrium: Normal right atrial size.  Pericardium: There is no evidence of pericardial effusion.  Mitral Valve: The mitral valve is normal in structure. Mitral leaflet mobility is normal. Trace mitral valve regurgitation is seen.  Tricuspid Valve: The tricuspid valve is normal in structure. Trivial tricuspid regurgitation is visualized.  Aortic Valve: The aortic valve is trileaflet. Sclerotic aortic valve with normal opening. Trivial aortic valve regurgitation is seen.  Pulmonic Valve: The pulmonic valve is normal. Mild pulmonic valve regurgitation.  Aorta: The aortic root and ascending aorta are structurally normal, with no evidence of dilitation.  Pulmonary Artery: The main pulmonary artery is normal in size.  Venous: The inferior vena cava was normal sized, with respiratory size variation greater than 50%.  In comparison to the previous echocardiogram(s): There are no prior studies on this patient for comparison purposes.    Allergies: No Known Allergies    PAST MEDICAL & SURGICAL HISTORY:  No pertinent past medical history  No significant past surgical history    Home Medications: None    Physical Examination:   General: Awake, alert, speech clear, no acute distress.  Neck: No bruit, normal jugular venous pressures  Chest: Clear CTA, S1, S2, no murmur, RRR  Extremities: No edema                          13.3   6.54  )-----------( 230      ( 29 Jun 2020 06:30 )             40.7     06-29    138  |  102  |  17.0  -------------------------<  101  4.1   |  24.0  |  1.05    Ca    9.0      29 Jun 2020 06:30  Phos  2.8     06-28  Mg     2.4     06-29    TPro  6.7  /  Alb  3.9  /  TBili  1.0  /  DBili  x   /  AST  30  /  ALT  15  /  AlkPhos  64  06-28    CARDIAC MARKERS ( 28 Jun 2020 13:28 ): 0.10 ng/mL     CARDIAC MARKERS ( 28 Jun 2020 06:42 ): 0.08 ng/mL   CARDIAC MARKERS ( 28 Jun 2020 03:06 ): 0.07 ng/mL, 269 U/L, 10.3 ng/mL  CARDIAC MARKERS ( 27 Jun 2020 22:43 ): 0.07 ng/mL       PT/INR - ( 27 Jun 2020 23:49 )   PT: 12.3 sec;   INR: 1.09 ratio    PTT - ( 29 Jun 2020 06:30 )  PTT:53.6 sec      Assessment and Plan:   The patient was examined and interviewed by me today. There has been no change from the original history and physical except as noted above.    Problem List:   1. Unstable angina  [facility] and possible intervention

## 2020-06-29 NOTE — DISCHARGE NOTE PROVIDER - NSDCCPCAREPLAN_GEN_ALL_CORE_FT
PRINCIPAL DISCHARGE DIAGNOSIS  Diagnosis: NSTEMI (non-ST elevated myocardial infarction)  Assessment and Plan of Treatment:       SECONDARY DISCHARGE DIAGNOSES  Diagnosis: Duodenitis  Assessment and Plan of Treatment:

## 2020-06-29 NOTE — PROGRESS NOTE ADULT - ASSESSMENT
64 yo M with no significant PMH, Ex- smoker ( smoked x 20 yrs, pack lasting x 1 week, quit 5 yrs ago) who presented with chest pain since 1 mos ago, with worsening in intensity over past 2  days. Cath attempted this AM but patient refused 2/2 to anxiety. Spoke with patient and son at length at bedside and explained risks and benefits of procedure, patient will reattempt cath in AM if anxiety medication can be provided.     #Chest pain, R/o ACS  -- no further chest pain while on heparin infusion   - Trop + x 3 slight elevation   -- ProBNP- 622,    -- tele monitoring,   -- TTE - wall motion abnormalities in RCA territory   -- cont ASA, Heparin, coreg, lisinopril,   -- EKG upon c/o of CP   -- Diet/lifestyle modifications and medication compliance heavily reinforced   -- Patient anxious about cath procedure  -- will order anxiety medication pre-cath to help anxiety   -- plan for cath in AM   -- NPO @ [place] 66 yo M with no significant PMH, Ex- smoker ( smoked x 20 yrs, pack lasting x 1 week, quit 5 yrs ago) who presented with chest pain since 1 mos ago, with worsening in intensity over past 2  days. Cath attempted this AM but patient refused 2/2 to anxiety. Spoke with patient and son at length at bedside and explained risks and benefits of procedure, patient will reattempt cath in AM if anxiety medication can be provided.     #Chest pain, R/o ACS  -- no further chest pain while on heparin infusion   - Trop + x 3 slight elevation   -- ProBNP- 622,    -- tele monitoring,   -- TTE - wall motion abnormalities in RCA territory   -- cont ASA, coreg,   -- Diet/lifestyle modifications and medication compliance heavily reinforced   -- Patient anxious and refusing cath procedure  -- will DC and follow closely outpatient   -- start plavix 75 daily   -- Diet/lifestyle modifications and medication compliance heavily reinforced   -- DC with outpatient follow up with Dr. Gladis ROSE within 1 week.   -- Patient advised to return to ED upon C/O CP

## 2020-06-29 NOTE — DISCHARGE NOTE PROVIDER - NSDCFUADDINST_GEN_ALL_CORE_FT
In addition to follow up with cardiology. You will need your thyroid function repeated. You will also need to see GI for the CT findings discussed.

## 2020-06-29 NOTE — PROGRESS NOTE ADULT - SUBJECTIVE AND OBJECTIVE BOX
[place] [facility]                          [address] [place] [address]                       Phone: [phone]. Fax:[phone]                      ________________________________________________           Reason for follow up/Overnight events: patient refused cath today. Patient is anxious.     HPI:  66 y/o male brought in by family for on and off CP over the past 3 weeks. Apparently his pain has gotten worse over the past couple days. He has no established care here and came from [facility] 8 months ago. Reported no medical history, on no prescription medications. No fevers, chills, SOB. No sick contacts. His pain seems to be more related to food ingestion, no reported diaphoresis or SOB. Alleviating factors are antacids. He has occasional nausea, but no vomiting, no dark stool or BRBPR. No leg swelling or orthopnea. In the ED EKG with bradycardia and non-specific ST changes with no prior for comparison. Initial Trop. .07 w/ elevated CKMB. Vitals stable. CXR clear. Seen by Cardiology who advised plavix/aspirin, and start on Heparin drip for ACS. Currently CP free. (2020 04:16)      ROS: All review of systems negative unless indicated otherwise below.                          LAB RESULTS                   COMPLETE BLOOD COUNT( 2020 06:30 )                            13.3 g/dL  6.54 K/uL )---------------( 230 K/uL                        40.7 %      Automated Differential     Auto Basophil # - 0.05 K/uL  Auto Basophil % - 0.8 %  Auto Eosinophil # - 1.09 K/uL<H>  Auto Eosinophil % - 16.7 %<H>  Auto Immature Granulocyte # - X      Auto Immature Granulocyte % - 0.3 %  Auto Lymphocyte # - 1.57 K/uL  Auto Lymphocyte % - 24.0 %  Auto Monocyte # - 0.55 K/uL  Auto Monocyte % - 8.4 %  Auto Neutrophil # - 3.26 K/uL  Auto Neutrophil % - 49.8 %                                  CHEMISTRY                 Basic Metabolic Panel (20 @ 06:30)    138  |  102  |  17.0  ----------------------------<  101<H>  4.1   |  24.0  |  1.05    Ca    9.0      2020 06:30  Phos  2.8       Mg     2.4                         Liver Functions (20 @ 06:42))  TPro  6.7  /  Alb  3.9  /  TBili  1.0  /  DBili  x   /  AST  30  /  ALT  15  /  AlkPhos  64     PT/INR/PTT ( 2020 06:30 )                        :                       :      X            :       53.6                  .        .                   .              .           .       X           .                                                                   Cardiac Enzymes   ( 2020 06:30 )  Troponin T  X    ,  CPK  214<H>, CKMB  X    , BNP X        , ( 2020 13:28 )  Troponin T  0.10<H>,  CPK  X    , CKMB  X    , BNP X        , ( 2020 06:42 )  Troponin T  0.08<H>,  CPK  X    , CKMB  X    , BNP X                              RADIOLOGY RESULTS: Personally visualized   < from: Xray Chest 2 Views PA/Lat (20 @ 23:14) >  IMPRESSION:  No evidence of active chest disease.    < end of copied text >    < from: CT Abdomen and Pelvis w/ Oral Cont and w/ IV Cont (20 @ 02:23) >  IMPRESSION:   Apparent wall thickening of the duodenal second portion. Recommend endoscopy to exclude inflammatory or neoplastic etiology.    < end of copied text >                          CARDIOLOGY RESULTS: Official Report/Preliminary Verbal Reports    ECHO:   < from: TTE Echo Complete w/o Doppler (20 @ 10:35) >  Summary:   1. Normal left atrial size.   2. There is mild septal left ventricular hypertrophy.   3. Subtle relative segmental wall motion abnormalities in RCA territory.   4. Left ventricular ejection fraction, by visual estimation, is 65 to 70%.   5. Normal right atrial size.   6. Normal right ventricular size and function.   7. No signfiicant valvular abnormality.   8. There is no evidence of pericardial effusion.    MD Regan, RPVI Electronically signed on 2020 at 4:23:34 PM    < end of copied text >                          CARDIOLOGY REVIEW: Personally visualized and reviewed  Telemetry Last 24h: Afib 105-130                              DAILY WEIGHTS - 48 HOUR TREND   Daily Weight in k (2020 05:21)                             INTAKE AND OUTPUT - 48 HOUR TREND     20 @ 07:01  -  20 @ 07:00  --------------------------------------------------------  IN:  Total IN: 0 mL    OUT:    Voided: 100 mL  Total OUT: 100 mL    Total NET: -100 mL                             Current Admission Active Medications    aspirin  chewable 81 milliGRAM(s) Oral daily  carvedilol 3.125 milliGRAM(s) Oral every 12 hours  heparin   Injectable 4100 Unit(s) IV Push every 6 hours PRN For aPTT less than 40  heparin  Infusion.  Unit(s)/Hr (8 mL/Hr) IV Continuous <Continuous>  lidocaine   Patch 1 Patch Transdermal daily  lisinopril 2.5 milliGRAM(s) Oral daily  multivitamin 1 Tablet(s) Oral daily  nitroglycerin     SubLingual 0.4 milliGRAM(s) SubLingual every 5 minutes PRN Chest Pain  ondansetron Injectable 4 milliGRAM(s) IV Push every 6 hours PRN Nausea  pantoprazole    Tablet 40 milliGRAM(s) Oral before breakfast  sodium chloride 0.9% lock flush 3 milliLiter(s) IV Push every 8 hours                        PHYSICAL EXAM:    Vital Signs Last 24 Hrs  T(C): 36.7 (2020 10:11), Max: 36.7 (2020 10:11)  T(F): 98.1 (2020 10:11), Max: 98.1 (2020 10:11)  HR: 80 (2020 17:08) (54 - 80)  BP: 151/82 (2020 17:08) (109/68 - 151/82)  BP(mean): --  RR: 16 (2020 10:11) (16 - 18)  SpO2: 98% (2020 10:11) (96% - 98%)    GENERAL: NAD  NECK: Supple, No JVD  NERVOUS SYSTEM:  Alert & Oriented X3, non focal neuro exam.   CHEST/LUNG: clear lungs, No rales, rhonchi, wheezing, or rubs  HEART: Regularly irregular rate and rhythm; s1 and s2 auscultated, No murmurs, rubs, or gallops  ABDOMEN: Soft, Nontender, Nondistended; Bowel sounds present and normoactive.   EXTREMITIES:  2+ Peripheral Pulses, No clubbing, cyanosis, or edema

## 2020-06-29 NOTE — DISCHARGE NOTE PROVIDER - NSDCMRMEDTOKEN_GEN_ALL_CORE_FT
aspirin 81 mg oral tablet, chewable: 1 tab(s) orally once a day  atorvastatin 10 mg oral tablet: 1 tab(s) orally once a day   carvedilol 3.125 mg oral tablet: 1 tab(s) orally every 12 hours  clopidogrel 75 mg oral tablet: 1 tab(s) orally once a day  lisinopril 2.5 mg oral tablet: 1 tab(s) orally once a day  pantoprazole 40 mg oral delayed release tablet: 1 tab(s) orally once a day (before a meal)

## 2020-06-29 NOTE — PROGRESS NOTE ADULT - SUBJECTIVE AND OBJECTIVE BOX
KATHLEEN NICOLE    [id number]    65y      Male    CC: Chest pain    INTERVAL HPI/OVERNIGHT EVENTS: pt seen and examined at bedside. video  services used. pt planned for cardiac cath this am, then refused. pt says chest pain resolved, denies abd pain, nausea. reports feeling well and does not want procedure. then deferring to son for further discussion.     reevaluated in pm with son at bedside.     REVIEW OF SYSTEMS:    CONSTITUTIONAL: No fever, weight loss, or fatigue  RESPIRATORY: No cough, wheezing, hemoptysis; No shortness of breath  CARDIOVASCULAR: No chest pain, palpitations  GASTROINTESTINAL: No abdominal or epigastric pain. No nausea, vomiting  NEUROLOGICAL: No headaches, memory loss, loss of strength.    Vital Signs Last 24 Hrs  T(C): 36.7 (29 Jun 2020 10:11), Max: 36.7 (29 Jun 2020 10:11)  T(F): 98.1 (29 Jun 2020 10:11), Max: 98.1 (29 Jun 2020 10:11)  HR: 54 (29 Jun 2020 10:11) (54 - 79)  BP: 145/70 (29 Jun 2020 10:11) (109/68 - 145/70)  BP(mean): --  RR: 16 (29 Jun 2020 10:11) (16 - 18)  SpO2: 98% (29 Jun 2020 10:11) (96% - 98%)    PHYSICAL EXAM:  GENERAL: NAD  HEENT: PERRL, +EOMI  NECK: soft, supple  CHEST/LUNG: Clear to auscultation bilaterally; No wheezing  HEART: S1S2+, bradycardic  ABDOMEN: Soft, Nondistended; Bowel sounds present, nontender  SKIN: No rashes or lesions  NEURO: AAOX3, no focal deficits    LABS:                        13.3   6.54  )-----------( 230      ( 29 Jun 2020 06:30 )             40.7     06-29    138  |  102  |  17.0  ----------------------------<  101<H>  4.1   |  24.0  |  1.05    Ca    9.0      29 Jun 2020 06:30  Phos  2.8     06-28  Mg     2.4     06-29    TPro  6.7  /  Alb  3.9  /  TBili  1.0  /  DBili  x   /  AST  30  /  ALT  15  /  AlkPhos  64  06-28    PT/INR - ( 27 Jun 2020 23:49 )   PT: 12.3 sec;   INR: 1.09 ratio         PTT - ( 29 Jun 2020 06:30 )  PTT:53.6 sec        MEDICATIONS  (STANDING):  aspirin  chewable 81 milliGRAM(s) Oral daily  carvedilol 3.125 milliGRAM(s) Oral every 12 hours  heparin  Infusion.  Unit(s)/Hr (8 mL/Hr) IV Continuous <Continuous>  lidocaine   Patch 1 Patch Transdermal daily  lisinopril 2.5 milliGRAM(s) Oral daily  multivitamin 1 Tablet(s) Oral daily  pantoprazole    Tablet 40 milliGRAM(s) Oral before breakfast  sodium chloride 0.9% lock flush 3 milliLiter(s) IV Push every 8 hours    MEDICATIONS  (PRN):  heparin   Injectable 4100 Unit(s) IV Push every 6 hours PRN For aPTT less than 40  nitroglycerin     SubLingual 0.4 milliGRAM(s) SubLingual every 5 minutes PRN Chest Pain  ondansetron Injectable 4 milliGRAM(s) IV Push every 6 hours PRN Nausea      RADIOLOGY & ADDITIONAL TESTS:    < from: TTE Echo Complete w/o Doppler (06.28.20 @ 10:35) >  Summary:   1. Normal left atrial size.   2. There is mild septal left ventricular hypertrophy.   3. Subtle relative segmental wall motion abnormalities in RCA territory.   4. Left ventricular ejection fraction, by visual estimation, is 65 to 70%.   5. Normal right atrial size.   6. Normal right ventricular size and function.   7. No signfiicant valvular abnormality.   8. There is no evidence of pericardial effusion.    < end of copied text >

## 2020-06-29 NOTE — DISCHARGE NOTE PROVIDER - CARE PROVIDER_API CALL
Ruddy Keane  INTERNAL MEDICINE  [address] [place] [address]  Phone: [phone]  Fax: [phone]  Follow Up Time: 1 week    Marva Griffith  CARDIOLOGY  [address] [id number]  Phone: [phone]  Fax: [phone]  Follow Up Time: 1 week    Zheng Fontenot  GASTROENTEROLOGY  [address] [place] [address]  Phone: [phone]  Fax: [phone]  Follow Up Time:

## 2020-06-29 NOTE — DISCHARGE NOTE PROVIDER - CARE PROVIDERS DIRECT ADDRESSES
[email or web address]

## 2020-06-29 NOTE — DISCHARGE NOTE PROVIDER - NSDCFUADDAPPT_GEN_ALL_CORE_FT
You will need to follow up with cardiology as discussed.   You will also need to establish care with a primary care physician.   Please schedule appointment with Gastroenterology.

## 2020-06-29 NOTE — PROGRESS NOTE ADULT - ASSESSMENT
65y/oM with ACS, likely PUD, elevated TSH    NSTEMI  -on heparin gtt  -s/p asa 325, plavix 300 in ER  -cont asa  -cont coreg  -TTE : LVEF 65-70%, no sig valvular abnormality  -cardio following  -Lipid panel: Tchol 128, LDL 86, HDL 32, TG 52  -A1c 5.7%  -pt refused cath this am. discussed at length with pt and pt's son.     PUD  -CT: evidence of duodenitis vs malignancy  -monitor for GIB, follow H/H   -cont PPI    Hypothyroid   -holding off on initiating synthroid until after LHC

## 2020-06-29 NOTE — DISCHARGE NOTE PROVIDER - HOSPITAL COURSE
64 y/o male brought in by family for on and off CP over the past 3 weeks. Apparently his pain has gotten worse over the past couple days. He has no established care here and came from [facility] 8 months ago. Reported no medical history, on no prescription medications. No fevers, chills, SOB. No sick contacts. His pain seems to be more related to food ingestion, no reported diaphoresis or SOB. Alleviating factors are antacids. He has occasional nausea, but no vomiting, no dark stool or BRBPR. No leg swelling or orthopnea. In the ED EKG with bradycardia and non-specific ST changes with no prior for comparison. Initial Trop. .07 w/ elevated CKMB. Vitals stable. CXR clear. Seen by Cardiology who advised plavix/aspirin, and start on Heparin drip for ACS. Pt's pain improved. Planned for cardiac cath 6/29 but pt refused this am. Lengthy d/w pt, pt's son at bedside both with hospitalist and cardiology team. Ultimate decision to d/c home with close follow up in cardio office. Also discussed need to establish care with primary care as well as GI for finding of duodenal wall thickening--poss inflammation vs neoplastic etiology.     Pt cleared from by cardiology team for discharge home. Instructed to return to ER is CP returns.         Vital Signs Last 24 Hrs    T(C): 36.3 (29 Jun 2020 17:23), Max: 36.7 (29 Jun 2020 10:11)    T(F): 97.3 (29 Jun 2020 17:23), Max: 98.1 (29 Jun 2020 10:11)    HR: 81 (29 Jun 2020 17:23) (54 - 81)    BP: 151/82 (29 Jun 2020 17:08) (109/68 - 151/82)    BP(mean): --    RR: 16 (29 Jun 2020 10:11) (16 - 18)    SpO2: 98% (29 Jun 2020 10:11) (96% - 98%)        GENERAL: NAD    HEENT: PERRL, +EOMI    NECK: soft, supple    CHEST/LUNG: Clear to auscultation bilaterally; No wheezing    HEART: S1S2+, bradycardic    ABDOMEN: Soft, Nondistended; Bowel sounds present, nontender    SKIN: No rashes or lesions    NEURO: AAOX3, no focal deficits        45minutes spent on discharge

## 2020-06-29 NOTE — DISCHARGE NOTE NURSING/CASE MANAGEMENT/SOCIAL WORK - PATIENT PORTAL LINK FT
You can access the FollowMyHealth Patient Portal offered by [facility] by registering at the following website: [email or web address]. By joining [employer]’s FollowMyHealth portal, you will also be able to view your health information using other applications (apps) compatible with our system.

## 2020-07-01 DIAGNOSIS — Z71.89 OTHER SPECIFIED COUNSELING: ICD-10-CM

## 2020-07-17 ENCOUNTER — APPOINTMENT (OUTPATIENT)
Dept: CARDIOLOGY | Facility: CLINIC | Age: 65
End: 2020-07-17
Payer: MEDICAID

## 2020-07-17 ENCOUNTER — NON-APPOINTMENT (OUTPATIENT)
Age: 65
End: 2020-07-17

## 2020-07-17 VITALS
BODY MASS INDEX: 24.11 KG/M2 | HEIGHT: 66 IN | HEART RATE: 55 BPM | WEIGHT: 150 LBS | SYSTOLIC BLOOD PRESSURE: 130 MMHG | DIASTOLIC BLOOD PRESSURE: 70 MMHG

## 2020-07-17 DIAGNOSIS — Z87.891 PERSONAL HISTORY OF NICOTINE DEPENDENCE: ICD-10-CM

## 2020-07-17 PROCEDURE — 99214 OFFICE O/P EST MOD 30 MIN: CPT

## 2020-07-17 PROCEDURE — 93000 ELECTROCARDIOGRAM COMPLETE: CPT

## 2020-07-21 ENCOUNTER — NON-APPOINTMENT (OUTPATIENT)
Age: 65
End: 2020-07-21

## 2020-07-21 NOTE — HISTORY OF PRESENT ILLNESS
[FreeTextEntry1] : Family members in the room: Billy (son), Taina (granddaughter)\par \par 66 y/o male former smoker with on and off CP over a month who reports to the office for the first time. He was in the [facility] last 6/28-29 for c/o CP, admitted for NSTEMI. He has no established care here and came from [facility] 8 months ago. Reported no medical history, on no prescription medications before going tot [facility]. ED EKG with bradycardia and non-specific ST changes with no prior for comparison. Initial Trop. .07 w/ elevated CKMB. CXR clear. Plavix and aspirin were started. He was scheduled to have cardiac cath 6/29 but pt refused. Today, he reports of being tired and has burning sensation in the chest. Has occasional palpitations. Denies shortness of breath, syncope or near syncope. No edema. Does walking exercises almost daily for 30 mins. States that if necessary after imaging, he agrees to having cardiac catheterization. He did not fully understood the procedure when he was in the hospital.

## 2020-07-21 NOTE — REASON FOR VISIT
[Chest Pain] : chest pain [Follow-Up - Clinic] : a clinic follow-up of [Family Member] : family member [FreeTextEntry1] : NSTEMI

## 2020-07-21 NOTE — DISCUSSION/SUMMARY
[Patient] : the patient [Risks] : risks [Benefits] : benefits [___ Month(s)] : [unfilled] month(s) [With ___] : with [unfilled] [FreeTextEntry1] : 64 y/o male former smoker with on and off CP over a month who reports to the office for the first time. He was in the [facility] last 6/28-29 for c/o CP, admitted for NSTEMI. He has no established care here and came from [facility] 8 months ago. Reported no medical history, on no prescription medications before going tot [facility]. ED EKG with bradycardia and non-specific ST changes with no prior for comparison. Initial Trop. .07 w/ elevated CKMB. CXR clear. Plavix and aspirin were started. He was scheduled to have cardiac cath 6/29 but pt refused. Today, he reports of being tired and has burning sensation in the chest. Has occasional palpitations. Denies shortness of breath, syncope or near syncope. No edema. Does walking exercises almost daily for 30 mins. States that if necessary after imaging, he agrees to having cardiac catheterization. He did not fully understood the procedure when he was in the hospital. Discussed with Dr. Griffith.\par \par A/P:\par \par 1. Chest pain/ NSTEMI - positive troponin in the hospital. Has segmental wall motion abnormalities in RCA territory seen on TTE. Still reporting burning sensation in the chest. Initially refused cardiac catheterization while in the hospital, now willing to undergo the procedure if imaging of his heart necessitates. Will schedule for CTA Cardiac. Continue with ASA, Plavix, Coreg, statin. Will add SL Nitro PRN\par 2. BP to goal. On Lisinopril \par 3. f/u with Dr. Griffith in 4 months or sooner\par \par Reyes Memoracion, NP

## 2020-07-21 NOTE — PHYSICAL EXAM
[No Deformities] : no deformities [General Appearance - In No Acute Distress] : no acute distress [Normal Conjunctiva] : the conjunctiva exhibited no abnormalities [Normal Jugular Venous V Waves Present] : normal jugular venous V waves present [Respiration, Rhythm And Depth] : normal respiratory rhythm and effort [Auscultation Breath Sounds / Voice Sounds] : lungs were clear to auscultation bilaterally [Heart Rate And Rhythm] : heart rate and rhythm were normal [Heart Sounds] : normal S1 and S2 [Murmurs] : no murmurs present [Edema] : no peripheral edema present [Abdomen Soft] : soft [Abnormal Walk] : normal gait [Cyanosis, Localized] : no localized cyanosis [Skin Color & Pigmentation] : normal skin color and pigmentation [Impaired Insight] : insight and judgment were intact [FreeTextEntry1] : wearing mask

## 2020-07-21 NOTE — END OF VISIT
[Time Spent: ___ minutes] : I have spent [unfilled] minutes of time on the encounter. [>50% of the face to face encounter time was spent on counseling and/or coordination of care for ___] : Greater than 50% of the face to face encounter time was spent on counseling and/or coordination of care for [unfilled] [FreeTextEntry3] : I personally discussed this patient with Nurse Practitioner/Physician Assistant. I agree with the assessment and plan as written, unless noted below.

## 2020-07-21 NOTE — REVIEW OF SYSTEMS
[Chest Pain] : chest pain [Shortness Of Breath] : no shortness of breath [Dyspnea on exertion] : not dyspnea during exertion [Lower Ext Edema] : no extremity edema [Palpitations] : no palpitations

## 2020-10-17 ENCOUNTER — TRANSCRIPTION ENCOUNTER (OUTPATIENT)
Age: 65
End: 2020-10-17

## 2020-11-20 ENCOUNTER — APPOINTMENT (OUTPATIENT)
Dept: CARDIOLOGY | Facility: CLINIC | Age: 65
End: 2020-11-20

## 2021-05-19 ENCOUNTER — APPOINTMENT (OUTPATIENT)
Dept: CARDIOLOGY | Facility: CLINIC | Age: 66
End: 2021-05-19
Payer: MEDICAID

## 2021-05-19 ENCOUNTER — NON-APPOINTMENT (OUTPATIENT)
Age: 66
End: 2021-05-19

## 2021-05-19 VITALS
WEIGHT: 147 LBS | BODY MASS INDEX: 23.63 KG/M2 | TEMPERATURE: 98.5 F | HEIGHT: 66 IN | OXYGEN SATURATION: 100 % | SYSTOLIC BLOOD PRESSURE: 138 MMHG | RESPIRATION RATE: 12 BRPM | HEART RATE: 58 BPM | DIASTOLIC BLOOD PRESSURE: 68 MMHG

## 2021-05-19 DIAGNOSIS — R53.1 WEAKNESS: ICD-10-CM

## 2021-05-19 DIAGNOSIS — Z78.9 OTHER SPECIFIED HEALTH STATUS: ICD-10-CM

## 2021-05-19 PROCEDURE — 93000 ELECTROCARDIOGRAM COMPLETE: CPT

## 2021-05-19 PROCEDURE — 99215 OFFICE O/P EST HI 40 MIN: CPT

## 2021-05-19 RX ORDER — PANTOPRAZOLE 40 MG/1
40 TABLET, DELAYED RELEASE ORAL DAILY
Qty: 90 | Refills: 2 | Status: DISCONTINUED | COMMUNITY
Start: 1900-01-01 | End: 2021-05-19

## 2021-05-19 RX ORDER — CARVEDILOL 3.12 MG/1
3.12 TABLET, FILM COATED ORAL
Qty: 180 | Refills: 2 | Status: DISCONTINUED | COMMUNITY
Start: 1900-01-01 | End: 2021-05-19

## 2021-05-19 RX ORDER — CLOPIDOGREL BISULFATE 75 MG/1
75 TABLET, FILM COATED ORAL
Qty: 90 | Refills: 2 | Status: DISCONTINUED | COMMUNITY
Start: 2020-07-17 | End: 2021-05-19

## 2021-05-19 NOTE — HISTORY OF PRESENT ILLNESS
[FreeTextEntry1] : Family members in the room: Billy (son), Taina (granddaughter)\par \par : grand daughter: Taina rosariolating. \par HPI for today: : when he walks or bends down he gets this chest pain. Otherwise ok.\par They have not been taking medicatiosn  sinc december 2020.   They were in [facility].  They came back this month.  They got their astrazeneca vaccine. \par No dizziness. sometimes when he walks. occasionally.  dyspnea on exertion :\par No palpitations. \par \par \par \par \par old note: 64 y/o male former smoker with on and off CP over a month who reports to the office for the first time. He was in the [facility] last 6/28-29 for c/o CP, admitted for NSTEMI. He has no established care here and came from [facility] 8 months ago. Reported no medical history, on no prescription medications before going tot [facility]. ED EKG with bradycardia and non-specific ST changes with no prior for comparison. Initial Trop. .07 w/ elevated CKMB. CXR clear. Plavix and aspirin were started. He was scheduled to have cardiac cath 6/29 but pt refused. Today, he reports of being tired and has burning sensation in the chest. Has occasional palpitations. Denies shortness of breath, syncope or near syncope. No edema. Does walking exercises almost daily for 30 mins. States that if necessary after imaging, he agrees to having cardiac catheterization. He did not fully understood the procedure when he was in the hospital.

## 2021-05-19 NOTE — DISCUSSION/SUMMARY
[Patient] : the patient [Risks] : risks [Benefits] : benefits [___ Month(s)] : in [unfilled] month(s) [With Me] : with me [FreeTextEntry1] : 66 y/o male former smoker with on and off CP over a month who reports to the office for the first time. He was in the [facility] last 6/28-29 for c/o CP, admitted for NSTEMI. He has no established care here and came from [facility] 8 months ago. Reported no medical history, on no prescription medications before going tot [facility]. ED EKG with bradycardia and non-specific ST changes with no prior for comparison. Initial Trop. .07 w/ elevated CKMB. CXR clear. Plavix and aspirin were started. He was scheduled to have cardiac cath 6/29 but pt refused. Today, he reports of being tired and has burning sensation in the chest. Has occasional palpitations. Denies shortness of breath, syncope or near syncope. No edema. Does walking exercises almost daily for 30 mins. States that if necessary after imaging, he agrees to having cardiac catheterization. He did not fully understood the procedure when he was in the hospital. Discussed with Dr. Griffith.\par \par A/P:\par \par 1. Chest pain/ NSTEMI - positive troponin in the hospital. Has segmental wall motion abnormalities in RCA territory seen on  .  cardiac cta. ASA, statin.  \par  \par

## 2021-05-19 NOTE — REASON FOR VISIT
[Hypertension] : hypertension [Coronary Artery Disease] : coronary artery disease [Follow-Up - Clinic] : a clinic follow-up of [Chest Pain] : chest pain [Family Member] : family member [FreeTextEntry1] : NSTEMI

## 2021-05-19 NOTE — CARDIOLOGY SUMMARY
[___] : [unfilled] [LVEF ___%] : LVEF [unfilled]% [None] : no pulmonary hypertension [Normal] : normal LA size [de-identified] : 5/19/2021

## 2021-06-15 ENCOUNTER — APPOINTMENT (OUTPATIENT)
Dept: CARDIOLOGY | Facility: CLINIC | Age: 66
End: 2021-06-15
Payer: MEDICAID

## 2021-06-15 PROCEDURE — 93306 TTE W/DOPPLER COMPLETE: CPT

## 2021-06-19 ENCOUNTER — LABORATORY RESULT (OUTPATIENT)
Age: 66
End: 2021-06-19

## 2021-06-21 ENCOUNTER — OUTPATIENT (OUTPATIENT)
Dept: OUTPATIENT SERVICES | Facility: HOSPITAL | Age: 66
LOS: 1 days | End: 2021-06-21
Payer: COMMERCIAL

## 2021-06-21 DIAGNOSIS — R06.02 SHORTNESS OF BREATH: ICD-10-CM

## 2021-06-21 PROCEDURE — 75574 CT ANGIO HRT W/3D IMAGE: CPT | Mod: 26

## 2021-06-21 PROCEDURE — 75574 CT ANGIO HRT W/3D IMAGE: CPT

## 2021-06-23 ENCOUNTER — NON-APPOINTMENT (OUTPATIENT)
Age: 66
End: 2021-06-23

## 2021-06-24 ENCOUNTER — OUTPATIENT (OUTPATIENT)
Dept: OUTPATIENT SERVICES | Facility: HOSPITAL | Age: 66
LOS: 1 days | End: 2021-06-24
Payer: COMMERCIAL

## 2021-06-24 ENCOUNTER — RESULT REVIEW (OUTPATIENT)
Age: 66
End: 2021-06-24

## 2021-06-24 DIAGNOSIS — R06.02 SHORTNESS OF BREATH: ICD-10-CM

## 2021-06-24 PROCEDURE — 0504T: CPT

## 2021-06-24 PROCEDURE — 0503T: CPT

## 2021-06-24 PROCEDURE — 0502T: CPT

## 2021-07-07 LAB
25(OH)D3 SERPL-MCNC: 24.2 NG/ML
ALBUMIN SERPL ELPH-MCNC: 4.6 G/DL
ALP BLD-CCNC: 84 U/L
ALT SERPL-CCNC: 58 U/L
ANION GAP SERPL CALC-SCNC: 13 MMOL/L
APO B SERPL-MCNC: 51 MG/DL
APO LP(A) SERPL-MCNC: 18 NMOL/L
AST SERPL-CCNC: 58 U/L
BASOPHILS # BLD AUTO: 0.07 K/UL
BASOPHILS NFR BLD AUTO: 0.8 %
BILIRUB SERPL-MCNC: 0.9 MG/DL
BUN SERPL-MCNC: 10 MG/DL
CALCIUM SERPL-MCNC: 9.4 MG/DL
CHLORIDE SERPL-SCNC: 104 MMOL/L
CHOLEST SERPL-MCNC: 103 MG/DL
CO2 SERPL-SCNC: 25 MMOL/L
CREAT SERPL-MCNC: 1.03 MG/DL
CRP SERPL-MCNC: <3 MG/L
EOSINOPHIL # BLD AUTO: 1.51 K/UL
EOSINOPHIL NFR BLD AUTO: 17.8 %
ESTIMATED AVERAGE GLUCOSE: 120 MG/DL
FOLATE SERPL-MCNC: 6.4 NG/ML
GLUCOSE SERPL-MCNC: 105 MG/DL
HBA1C MFR BLD HPLC: 5.8 %
HCT VFR BLD CALC: 45 %
HDLC SERPL-MCNC: 41 MG/DL
HGB BLD-MCNC: 13.6 G/DL
IMM GRANULOCYTES NFR BLD AUTO: 0.2 %
LDLC SERPL CALC-MCNC: 46 MG/DL
LYMPHOCYTES # BLD AUTO: 2.54 K/UL
LYMPHOCYTES NFR BLD AUTO: 30 %
MAN DIFF?: NORMAL
MCHC RBC-ENTMCNC: 27.9 PG
MCHC RBC-ENTMCNC: 30.2 GM/DL
MCV RBC AUTO: 92.4 FL
MONOCYTES # BLD AUTO: 0.6 K/UL
MONOCYTES NFR BLD AUTO: 7.1 %
NEUTROPHILS # BLD AUTO: 3.73 K/UL
NEUTROPHILS NFR BLD AUTO: 44.1 %
NONHDLC SERPL-MCNC: 62 MG/DL
PLATELET # BLD AUTO: 242 K/UL
POTASSIUM SERPL-SCNC: 4.2 MMOL/L
PROT SERPL-MCNC: 7.4 G/DL
RBC # BLD: 4.87 M/UL
RBC # FLD: 13.1 %
SODIUM SERPL-SCNC: 142 MMOL/L
TRIGL SERPL-MCNC: 80 MG/DL
TSH SERPL-ACNC: 6.96 UIU/ML
VIT B12 SERPL-MCNC: 235 PG/ML
WBC # FLD AUTO: 8.47 K/UL

## 2021-07-09 ENCOUNTER — NON-APPOINTMENT (OUTPATIENT)
Age: 66
End: 2021-07-09

## 2021-07-28 RX ORDER — CHLORHEXIDINE GLUCONATE 213 G/1000ML
1 SOLUTION TOPICAL ONCE
Refills: 0 | Status: DISCONTINUED | OUTPATIENT
Start: 2021-07-29 | End: 2021-07-30

## 2021-07-28 NOTE — H&P PST ADULT - NSICDXPASTMEDICALHX_GEN_ALL_CORE_FT
PAST MEDICAL HISTORY:  NSTEMI (non-ST elevation myocardial infarction)      PAST MEDICAL HISTORY:  HLD (hyperlipidemia) atorvastatin    HTN (hypertension) metoprolol    NSTEMI (non-ST elevation myocardial infarction) plavix

## 2021-07-28 NOTE — H&P PST ADULT - HISTORY OF PRESENT ILLNESS
HPI: 66 yo M with no significant PMHx prior to 6/28 when he presented to [facility] ED with C/O CP. He is an  Ex- smoker ( smoked x 20 yrs, pack lasting x 1 week, quit 5 yrs ago) who presented on 6/28 with chest pain since 1 mos ago, with worsening in intensity over past 2 days. Pt described pain as burning, worse after eating, and sometimes with exertion. Pt states that pain is anterior, non radiating, non pleuritic. Pt states that pain is somewhat relieved with antacids. Pt also c/o constipation. + nausea, intermittent. + early satiety.  Pt denies any SOB/dizziness/syncope/ diaphoresis/palpitations/F/C/cough/sick contacts/recent travelling/other complaints. He spilled troponins  at that time , initial troponin 0.07, and he was scheduled to have a LHC on 6/29 but refused.  He was started on ASA and plavix that admission.  He returned to the cardiologist with continued complaints of burning in the chest with occasional palpitations.  He was sent for an echocardiogram and scheduled again for a cardiac catheterization which he states he will now do.     ASA  Mallampati  GFR  Creat  Bleeding Risk  COVID19 - Vaccinated with [employer]zen[employer] in [place].    Symptoms:        Angina (Class): 3       Ischemic Symptoms: Chest pain, burning, and occasional MONGE.     Heart Failure: No     Assessment of LVEF:       EF: 65-70%       Assessed by: TTE        Date: 6/28/2020    Prior Cardiac Interventions: NONE        Noninvasive Testing:     Echo:   < from: TTE Echo Complete w/o Doppler (06.28.20 @ 10:35) >  Summary:   1. Normal left atrial size.   2. There is mild septal left ventricular hypertrophy.   3. Subtle relative segmental wall motion abnormalities in RCA territory.   4. Left ventricular ejection fraction, by visual estimation, is 65 to 70%.   5. Normal right atrial size.   6. Normal right ventricular size and function.   7. No signfiicant valvular abnormality.   8. There is no evidence of pericardial effusion.    MD Regan, RPVI Electronically signed on 6/28/2020 at 4:23:34 PM    < end of copied text >      Antianginal Therapies:        Beta Blockers:  Carvedilol       Calcium Channel Blockers:        Long Acting Nitrates:        Ranexa:     Associated Risk Factors:        Cerebrovascular Disease: N/A       Chronic Lung Disease: N/A       Peripheral Arterial Disease: N/A       Chronic Kidney Disease (if yes, what is GFR): N/A       Uncontrolled Diabetes (if yes, what is HgbA1C or FBS): N/A       Poorly Controlled Hypertension (if yes, what is SBP): N/A       Morbid Obesity (if yes, what is BMI): N/A       History of Recent Ventricular Arrhythmia: N/A       Inability to Ambulate Safely: N/A       Need for Therapeutic Anticoagulation: N/A       Antiplatelet or Contrast Allergy: N/A HPI: 64 yo M with no significant PMHx prior to 6/28/2020  when he presented to [facility] ED with C/O CP. He is an  Ex- smoker ( smoked x 20 yrs, pack lasting x 1 week, quit 5 yrs ago) who presented on 6/28/2020 with chest pain since 1 mos ago, with worsening in intensity over past 2 days. Pt described pain as burning, worse after eating, and sometimes with exertion. Pt states that pain is anterior, non radiating, non pleuritic. Pt states that pain is somewhat relieved with antacids. Pt also c/o constipation. + nausea, intermittent. + early satiety.  Pt denies any SOB/dizziness/syncope/ diaphoresis/palpitations/F/C/cough/sick contacts/recent travelling/other complaints. He spilled troponins  at that time , initial troponin 0.07, and he was scheduled to have a C on 6/29 but refused.  He was started on ASA and plavix that admission.  He returned to the cardiologist with continued complaints of burning in the chest with occasional palpitations.  He was sent for an echocardiogram and scheduled again for a cardiac catheterization which he states he will now do.     ASA 3  Mallampati 2   GFR 74  Creat 7.05  Bleeding Risk 1.2   COVID19 - Vaccinated with Astrzen[employer] in [facility].    Symptoms:        Angina (Class): 3       Ischemic Symptoms: Chest pain, burning, and occasional MONGE.     Heart Failure: No     Assessment of LVEF:       EF: 65-70%       Assessed by: TTE        Date: 6/28/2020    Prior Cardiac Interventions: NONE        Noninvasive Testing:     Echo:   < from: TTE Echo Complete w/o Doppler (06.28.20 @ 10:35) >  Summary:   1. Normal left atrial size.   2. There is mild septal left ventricular hypertrophy.   3. Subtle relative segmental wall motion abnormalities in RCA territory.   4. Left ventricular ejection fraction, by visual estimation, is 65 to 70%.   5. Normal right atrial size.   6. Normal right ventricular size and function.   7. No signfiicant valvular abnormality.   8. There is no evidence of pericardial effusion.    MD Regan, RPVI Electronically signed on 6/28/2020 at 4:23:34 PM    < end of copied text >      Antianginal Therapies:        Beta Blockers:  Carvedilol       Calcium Channel Blockers:        Long Acting Nitrates:        Ranexa:     Associated Risk Factors:        Cerebrovascular Disease: N/A       Chronic Lung Disease: N/A       Peripheral Arterial Disease: N/A       Chronic Kidney Disease (if yes, what is GFR): N/A       Uncontrolled Diabetes (if yes, what is HgbA1C or FBS): N/A       Poorly Controlled Hypertension (if yes, what is SBP): N/A       Morbid Obesity (if yes, what is BMI): N/A       History of Recent Ventricular Arrhythmia: N/A       Inability to Ambulate Safely: N/A       Need for Therapeutic Anticoagulation: N/A       Antiplatelet or Contrast Allergy: N/A HPI: 64 yo M with no significant PMHx prior to 6/28/2020  when he presented to [facility] ED with C/O CP. He is an  Ex- smoker ( smoked x 20 yrs, pack lasting x 1 week, quit 5 yrs ago) who presented on 6/28/2020 with chest pain since 1 mos ago, with worsening in intensity over past 2 days. Pt described pain as burning, worse after eating, and sometimes with exertion. Pt states that pain is anterior, non radiating, non pleuritic. Pt states that pain is somewhat relieved with antacids. Pt also c/o constipation. + nausea, intermittent. + early satiety.  Pt denies any SOB/dizziness/syncope/ diaphoresis/palpitations/F/C/cough/sick contacts/recent travelling/other complaints. He spilled troponins  at that time , initial troponin 0.07, and he was scheduled to have a C on 6/29 but refused.  He was started on ASA and plavix that admission.  He returned to the cardiologist with continued complaints of burning in the chest with occasional palpitations.  He was sent for an echocardiogram and scheduled again for a cardiac catheterization which he states he will now do.     ASA 3  Mallampati 2   GFR 85  Creat ..93  Bleeding Risk 1.2   COVID19 - Vaccinated with Astrzen[employer] in [facility].    Symptoms:        Angina (Class): 3       Ischemic Symptoms: Chest pain, burning, and occasional MONGE.     Heart Failure: No     Assessment of LVEF:       EF: 65-70%       Assessed by: TTE        Date: 6/28/2020    Prior Cardiac Interventions: NONE        Noninvasive Testing:     Echo:   < from: TTE Echo Complete w/o Doppler (06.28.20 @ 10:35) >  Summary:   1. Normal left atrial size.   2. There is mild septal left ventricular hypertrophy.   3. Subtle relative segmental wall motion abnormalities in RCA territory.   4. Left ventricular ejection fraction, by visual estimation, is 65 to 70%.   5. Normal right atrial size.   6. Normal right ventricular size and function.   7. No signfiicant valvular abnormality.   8. There is no evidence of pericardial effusion.    MD Regan, RPVI Electronically signed on 6/28/2020 at 4:23:34 PM    < end of copied text >      Antianginal Therapies:        Beta Blockers:  Carvedilol       Calcium Channel Blockers:        Long Acting Nitrates:        Ranexa:     Associated Risk Factors:        Cerebrovascular Disease: N/A       Chronic Lung Disease: N/A       Peripheral Arterial Disease: N/A       Chronic Kidney Disease (if yes, what is GFR): N/A       Uncontrolled Diabetes (if yes, what is HgbA1C or FBS): N/A       Poorly Controlled Hypertension (if yes, what is SBP): N/A       Morbid Obesity (if yes, what is BMI): N/A       History of Recent Ventricular Arrhythmia: N/A       Inability to Ambulate Safely: N/A       Need for Therapeutic Anticoagulation: N/A       Antiplatelet or Contrast Allergy: N/A

## 2021-07-28 NOTE — H&P PST ADULT - ASSESSMENT
64yo [place] male sp NSTEMI on 6/28/20 who had refused a cardiac catheterization at that time, now back for the [facility].     Plan: PRE-PROCEDURE ASSESSMENT    -Patient seen and examined  PRE-PROCEDURE ASSESSMENT  -NPO after midnight confirmed  -IV insert  -Patient seen and examined  -Labs reviewed  -Pre-procedure teaching completed with patient   -consent obtained   -Questions answered   66yo [place] male sp NSTEMI on 6/28/20 who had refused a cardiac catheterization at that time, now back for the [facility].     Plan: PRE-PROCEDURE ASSESSMENT    -Patient seen and examined  PRE-PROCEDURE ASSESSMENT  -NPO after midnight confirmed  -IV insert  -Patient seen and examined  -Labs reviewed  -Pre-procedure teaching completed with patient   -consent obtained   -Questions answered with

## 2021-07-29 ENCOUNTER — INPATIENT (INPATIENT)
Facility: HOSPITAL | Age: 66
LOS: 0 days | Discharge: HOME | DRG: 247 | End: 2021-07-30
Attending: INTERNAL MEDICINE | Admitting: INTERNAL MEDICINE
Payer: COMMERCIAL

## 2021-07-29 ENCOUNTER — TRANSCRIPTION ENCOUNTER (OUTPATIENT)
Age: 66
End: 2021-07-29

## 2021-07-29 VITALS
SYSTOLIC BLOOD PRESSURE: 179 MMHG | HEART RATE: 67 BPM | DIASTOLIC BLOOD PRESSURE: 79 MMHG | OXYGEN SATURATION: 98 % | RESPIRATION RATE: 20 BRPM | TEMPERATURE: 98 F

## 2021-07-29 DIAGNOSIS — I25.10 ATHEROSCLEROTIC HEART DISEASE OF NATIVE CORONARY ARTERY WITHOUT ANGINA PECTORIS: ICD-10-CM

## 2021-07-29 LAB
ABO RH CONFIRMATION: SIGNIFICANT CHANGE UP
ALBUMIN SERPL ELPH-MCNC: 4.5 G/DL — SIGNIFICANT CHANGE UP (ref 3.3–5.2)
ALP SERPL-CCNC: 95 U/L — SIGNIFICANT CHANGE UP (ref 40–120)
ALT FLD-CCNC: 20 U/L — SIGNIFICANT CHANGE UP
ANION GAP SERPL CALC-SCNC: 9 MMOL/L — SIGNIFICANT CHANGE UP (ref 5–17)
AST SERPL-CCNC: 27 U/L — SIGNIFICANT CHANGE UP
BASOPHILS # BLD AUTO: 0.08 K/UL — SIGNIFICANT CHANGE UP (ref 0–0.2)
BASOPHILS NFR BLD AUTO: 1 % — SIGNIFICANT CHANGE UP (ref 0–2)
BILIRUB SERPL-MCNC: 0.8 MG/DL — SIGNIFICANT CHANGE UP (ref 0.4–2)
BLD GP AB SCN SERPL QL: SIGNIFICANT CHANGE UP
BUN SERPL-MCNC: 10 MG/DL — SIGNIFICANT CHANGE UP (ref 8–20)
CALCIUM SERPL-MCNC: 9.3 MG/DL — SIGNIFICANT CHANGE UP (ref 8.6–10.2)
CHLORIDE SERPL-SCNC: 105 MMOL/L — SIGNIFICANT CHANGE UP (ref 98–107)
CHOLEST SERPL-MCNC: 85 MG/DL — SIGNIFICANT CHANGE UP
CO2 SERPL-SCNC: 26 MMOL/L — SIGNIFICANT CHANGE UP (ref 22–29)
CREAT SERPL-MCNC: 0.93 MG/DL — SIGNIFICANT CHANGE UP (ref 0.5–1.3)
EOSINOPHIL # BLD AUTO: 1.3 K/UL — HIGH (ref 0–0.5)
EOSINOPHIL NFR BLD AUTO: 16.2 % — HIGH (ref 0–6)
GLUCOSE SERPL-MCNC: 103 MG/DL — HIGH (ref 70–99)
HCT VFR BLD CALC: 44.7 % — SIGNIFICANT CHANGE UP (ref 39–50)
HDLC SERPL-MCNC: 40 MG/DL — LOW
HGB BLD-MCNC: 13.9 G/DL — SIGNIFICANT CHANGE UP (ref 13–17)
IMM GRANULOCYTES NFR BLD AUTO: 0.2 % — SIGNIFICANT CHANGE UP (ref 0–1.5)
INR BLD: 1.02 RATIO — SIGNIFICANT CHANGE UP (ref 0.88–1.16)
LIPID PNL WITH DIRECT LDL SERPL: 35 MG/DL — SIGNIFICANT CHANGE UP
LYMPHOCYTES # BLD AUTO: 2.07 K/UL — SIGNIFICANT CHANGE UP (ref 1–3.3)
LYMPHOCYTES # BLD AUTO: 25.8 % — SIGNIFICANT CHANGE UP (ref 13–44)
MCHC RBC-ENTMCNC: 28 PG — SIGNIFICANT CHANGE UP (ref 27–34)
MCHC RBC-ENTMCNC: 31.1 GM/DL — LOW (ref 32–36)
MCV RBC AUTO: 89.9 FL — SIGNIFICANT CHANGE UP (ref 80–100)
MONOCYTES # BLD AUTO: 0.47 K/UL — SIGNIFICANT CHANGE UP (ref 0–0.9)
MONOCYTES NFR BLD AUTO: 5.9 % — SIGNIFICANT CHANGE UP (ref 2–14)
NEUTROPHILS # BLD AUTO: 4.08 K/UL — SIGNIFICANT CHANGE UP (ref 1.8–7.4)
NEUTROPHILS NFR BLD AUTO: 50.9 % — SIGNIFICANT CHANGE UP (ref 43–77)
NON HDL CHOLESTEROL: 45 MG/DL — SIGNIFICANT CHANGE UP
PLATELET # BLD AUTO: 201 K/UL — SIGNIFICANT CHANGE UP (ref 150–400)
POTASSIUM SERPL-MCNC: 4 MMOL/L — SIGNIFICANT CHANGE UP (ref 3.5–5.3)
POTASSIUM SERPL-SCNC: 4 MMOL/L — SIGNIFICANT CHANGE UP (ref 3.5–5.3)
PROT SERPL-MCNC: 8.2 G/DL — SIGNIFICANT CHANGE UP (ref 6.6–8.7)
PROTHROM AB SERPL-ACNC: 11.8 SEC — SIGNIFICANT CHANGE UP (ref 10.6–13.6)
RBC # BLD: 4.97 M/UL — SIGNIFICANT CHANGE UP (ref 4.2–5.8)
RBC # FLD: 12.6 % — SIGNIFICANT CHANGE UP (ref 10.3–14.5)
SODIUM SERPL-SCNC: 140 MMOL/L — SIGNIFICANT CHANGE UP (ref 135–145)
TRIGL SERPL-MCNC: 50 MG/DL — SIGNIFICANT CHANGE UP
WBC # BLD: 8.02 K/UL — SIGNIFICANT CHANGE UP (ref 3.8–10.5)
WBC # FLD AUTO: 8.02 K/UL — SIGNIFICANT CHANGE UP (ref 3.8–10.5)

## 2021-07-29 PROCEDURE — 99152 MOD SED SAME PHYS/QHP 5/>YRS: CPT | Mod: 59

## 2021-07-29 PROCEDURE — 93571 IV DOP VEL&/PRESS C FLO 1ST: CPT | Mod: 26,LD,59

## 2021-07-29 PROCEDURE — 93010 ELECTROCARDIOGRAM REPORT: CPT | Mod: 76

## 2021-07-29 PROCEDURE — 92978 ENDOLUMINL IVUS OCT C 1ST: CPT | Mod: 26,LC

## 2021-07-29 PROCEDURE — 92928 PRQ TCAT PLMT NTRAC ST 1 LES: CPT | Mod: LC

## 2021-07-29 PROCEDURE — 99223 1ST HOSP IP/OBS HIGH 75: CPT | Mod: 25

## 2021-07-29 PROCEDURE — 93454 CORONARY ARTERY ANGIO S&I: CPT | Mod: 26,XU

## 2021-07-29 RX ORDER — METOPROLOL TARTRATE 50 MG
25 TABLET ORAL DAILY
Refills: 0 | Status: DISCONTINUED | OUTPATIENT
Start: 2021-07-29 | End: 2021-07-30

## 2021-07-29 RX ORDER — ASPIRIN/CALCIUM CARB/MAGNESIUM 324 MG
81 TABLET ORAL DAILY
Refills: 0 | Status: DISCONTINUED | OUTPATIENT
Start: 2021-07-29 | End: 2021-07-30

## 2021-07-29 RX ORDER — ATORVASTATIN CALCIUM 80 MG/1
40 TABLET, FILM COATED ORAL AT BEDTIME
Refills: 0 | Status: DISCONTINUED | OUTPATIENT
Start: 2021-07-29 | End: 2021-07-30

## 2021-07-29 RX ORDER — ACETAMINOPHEN 500 MG
650 TABLET ORAL ONCE
Refills: 0 | Status: COMPLETED | OUTPATIENT
Start: 2021-07-29 | End: 2021-07-29

## 2021-07-29 RX ORDER — CLOPIDOGREL BISULFATE 75 MG/1
75 TABLET, FILM COATED ORAL DAILY
Refills: 0 | Status: DISCONTINUED | OUTPATIENT
Start: 2021-07-29 | End: 2021-07-30

## 2021-07-29 RX ADMIN — Medication 650 MILLIGRAM(S): at 22:07

## 2021-07-29 RX ADMIN — Medication 650 MILLIGRAM(S): at 23:00

## 2021-07-29 RX ADMIN — Medication 25 MILLIGRAM(S): at 15:41

## 2021-07-29 RX ADMIN — Medication 81 MILLIGRAM(S): at 10:58

## 2021-07-29 RX ADMIN — ATORVASTATIN CALCIUM 40 MILLIGRAM(S): 80 TABLET, FILM COATED ORAL at 22:07

## 2021-07-29 NOTE — DISCHARGE NOTE PROVIDER - HOSPITAL COURSE
Narrative:  66y  Male now s/p left heart catheterization via Right Radial  Artery with Dr Arroyo ,  1.5 Versed IV and 75 mg of Fentanyl heparin 6000 ACT post was >400 Plavix Load 600 mg  intraprocedurally, arrived to recovery in NAD and HDS, Right Radial access site stable, no bleed/hematoma, distal pulse +. He has no complaints post cath and is resting comfortably .      MARY to the LCX   The FFR of the LAD was .89 moderate disease will continue to manage medically.     PAST MEDICAL & SURGICAL HISTORY:  NSTEMI (non-ST elevation myocardial infarction)  plavix    HTN (hypertension)  metoprolol    HLD (hyperlipidemia)  atorvastatin    No significant past surgical history    Home Medications:  Metoprolol Succinate ER 25 mg oral tablet, extended release: 1 tab(s) orally once a day (29 Jul 2021 09:10)    No Known Allergies      Objective:  Vital Signs Last 24 Hrs  T(C): 36.4 (29 Jul 2021 09:10), Max: 36.4 (29 Jul 2021 09:10)  T(F): 97.6 (29 Jul 2021 09:10), Max: 97.6 (29 Jul 2021 09:10)  HR: 53 (29 Jul 2021 12:45) (53 - 67)  BP: 129/76 (29 Jul 2021 12:45) (129/76 - 179/79)  RR: 16 (29 Jul 2021 12:45) (16 - 20)  SpO2: 98% (29 Jul 2021 12:45) (98% - 98%)    ROS: as stated above, otherwise negative    PHYSICAL EXAM:  Constitutional: A & O x 3, NAD  HEENT:  Normal oral mucosa, PERRL, EOMI	  Cardiovascular: S1 S2, No murmurs, No JVD  Respiratory: Lungs clear to auscultation	  Gastrointestinal:  Soft, Non-tender, + BS	  Skin: No rashes or cyanosis  Neurologic: No deficit appreciated  Extremities: Normal range of motion, no edema  Vascular: Access site stable, no bleed or hematoma, distal pulses +   Right radial site benign     Labs:                           13.9   8.02  )-----------( 201      ( 29 Jul 2021 09:25 )             44.7     07-29    140  |  105  |  10.0  ----------------------------<  103<H>  4.0   |  26.0  |  0.93    Ca    9.3      29 Jul 2021 09:25    TPro  8.2  /  Alb  4.5  /  TBili  0.8  /  DBili  x   /  AST  27  /  ALT  20  /  AlkPhos  95  07-29    PT/INR - ( 29 Jul 2021 09:26 )   PT: 11.8 sec;   INR: 1.02 ratio               Assessment:   66y  Male now s/p left heart catheterization via Right Radial  Artery with Dr Arroyo ,  1.5 Versed IV and 75 mg of Fentanyl heparin 6000 ACT post was >400 Plavix Load 600 mg  intraprocedurally, arrived to recovery in NAD and HDS, Right Radial access site stable, no bleed/hematoma, distal pulse +. He has no complaints post cath and is resting comfortably .      Plan:  -Formal cath report pending  -Post procedure management/monitoring per protocol  -Access site precautions  -Radial compression band removal at 1330  -Bedrest x 1 hours post procedure  -EKG post PCI stable  -Labs and EKG in am  -Repeat ECG if any clinical indication or change on tele  -Continue current medical therapy  -Dual anti platelet therapy with aspirin/plavix   -Cont BB with Metoprolol ER 25mg  po daily   -Cont statin therapy with Lipitor 40mg po qHS  -Educated regarding strict adherence with DAPT   -Educated regarding post procedure management and care  -Discussed the importance of RF modification  -Cardiac rehab info provided/referral and communication to cardiac rehab completed

## 2021-07-29 NOTE — DISCHARGE NOTE PROVIDER - NSDCFUADDINST_GEN_ALL_CORE_FT
Restricted use with no heavy lifting of affected arm for 48 hours.  No submerging the arm in water for 48 hours.  You may start showering today.  Call your doctor for any bleeding, swelling, loss of sensation in the hand or fingers, or fingers turning blue.  If heavy bleeding or large lumps form, hold pressure at the spot and come to the Emergency Room.

## 2021-07-29 NOTE — DISCHARGE NOTE PROVIDER - CARE PROVIDER_API CALL
Masood Arroyo)  Cardiovascular Disease; Interventional Cardiology  [address] [facility], [address] [place] [address]  Phone: [phone]  Fax: [phone]  Follow Up Time:

## 2021-07-29 NOTE — DISCHARGE NOTE PROVIDER - NSDCCPCAREPLAN_GEN_ALL_CORE_FT
PRINCIPAL DISCHARGE DIAGNOSIS  Diagnosis: CAD (coronary artery disease)  Assessment and Plan of Treatment: Do not stop Aspirin or Plavix without speaking with cardiologist first  Restricted use with no heavy lifting of affected arm for 48 hours.  No submerging the arm in water for 48 hours.  You may start showering today.  Call your doctor for any bleeding, swelling, loss of sensation in the hand or fingers, or fingers turning blue.  If heavy bleeding or large lumps form, hold pressure at the spot and come to the Emergency Room.

## 2021-07-29 NOTE — DISCHARGE NOTE PROVIDER - NSDCMRMEDTOKEN_GEN_ALL_CORE_FT
atorvastatin 10 mg oral tablet: 1 tab(s) orally once a day   clopidogrel 75 mg oral tablet: 1 tab(s) orally once a day  Metoprolol Succinate ER 25 mg oral tablet, extended release: 1 tab(s) orally once a day   Adult Aspirin Regimen 81 mg oral delayed release tablet: 1 tab(s) orally once a day  atorvastatin 40 mg oral tablet: 1 tab(s) orally once a day (at bedtime)  clopidogrel 75 mg oral tablet: 1 tab(s) orally once a day  Metoprolol Succinate ER 25 mg oral tablet, extended release: 1 tab(s) orally once a day

## 2021-07-29 NOTE — PROGRESS NOTE ADULT - SUBJECTIVE AND OBJECTIVE BOX
Department of Cardiology                                                                  [facility]/[place] [address] [address] [facility]                                                            Telephone: [phone]. Fax:[phone]                                                    Post- Procedure Note: Left Heart Cardiac Catheterization       Narrative:  66y  Male now s/p left heart catheterization via Right Radial  Artery with Dr Arroyo ,  1.5 Versed IV and 75 mg of Fentanyl heparin 6000 ACT post was >400 Plavix Load 600 mg  intraprocedurally, arrived to recovery in NAD and HDS, Right Radial access site stable, no bleed/hematoma, distal pulse +. He has no complaints post cath and is resting comfortably .      MARY to the LCX   The FFR of the LAD was .89 moderate disease will continue to manage medically.     PAST MEDICAL & SURGICAL HISTORY:  NSTEMI (non-ST elevation myocardial infarction)  plavix    HTN (hypertension)  metoprolol    HLD (hyperlipidemia)  atorvastatin    No significant past surgical history    Home Medications:  Metoprolol Succinate ER 25 mg oral tablet, extended release: 1 tab(s) orally once a day (29 Jul 2021 09:10)    No Known Allergies      Objective:  Vital Signs Last 24 Hrs  T(C): 36.4 (29 Jul 2021 09:10), Max: 36.4 (29 Jul 2021 09:10)  T(F): 97.6 (29 Jul 2021 09:10), Max: 97.6 (29 Jul 2021 09:10)  HR: 53 (29 Jul 2021 12:45) (53 - 67)  BP: 129/76 (29 Jul 2021 12:45) (129/76 - 179/79)  RR: 16 (29 Jul 2021 12:45) (16 - 20)  SpO2: 98% (29 Jul 2021 12:45) (98% - 98%)    ROS: as stated above, otherwise negative    PHYSICAL EXAM:  Constitutional: A & O x 3, NAD  HEENT:  Normal oral mucosa, PERRL, EOMI	  Cardiovascular: S1 S2, No murmurs, No JVD  Respiratory: Lungs clear to auscultation	  Gastrointestinal:  Soft, Non-tender, + BS	  Skin: No rashes or cyanosis  Neurologic: No deficit appreciated  Extremities: Normal range of motion, no edema  Vascular: Access site stable, no bleed or hematoma, distal pulses +   Right radial site benign     Labs:                           13.9   8.02  )-----------( 201      ( 29 Jul 2021 09:25 )             44.7     07-29    140  |  105  |  10.0  ----------------------------<  103<H>  4.0   |  26.0  |  0.93    Ca    9.3      29 Jul 2021 09:25    TPro  8.2  /  Alb  4.5  /  TBili  0.8  /  DBili  x   /  AST  27  /  ALT  20  /  AlkPhos  95  07-29    PT/INR - ( 29 Jul 2021 09:26 )   PT: 11.8 sec;   INR: 1.02 ratio               Assessment:   66y  Male now s/p left heart catheterization via Right Radial  Artery with Dr Arroyo ,  1.5 Versed IV and 75 mg of Fentanyl heparin 6000 ACT post was >400 Plavix Load 600 mg  intraprocedurally, arrived to recovery in NAD and HDS, Right Radial access site stable, no bleed/hematoma, distal pulse +. He has no complaints post cath and is resting comfortably .      Plan:  -Formal cath report pending  -Post procedure management/monitoring per protocol  -Access site precautions  -Radial compression band removal at 1330  -Bedrest x 1 hours post procedure  -EKG post PCI stable  -Labs and EKG in am  -Repeat ECG if any clinical indication or change on tele  -Continue current medical therapy  -Dual anti platelet therapy with aspirin/plavix   -Cont BB with Metoprolol ER 25mg  po daily   -Cont statin therapy with Lipitor 40mg po qHS  -Educated regarding strict adherence with DAPT   -Educated regarding post procedure management and care  -Discussed the importance of RF modification  -Cardiac rehab info provided/referral and communication to cardiac rehab completed  -F/U outpt in 1-2 weeks with Cardiologist Dr. Griffith   -DISPO: Home  Plan for D/C in am if remains HDS, ECG and labs in am stable and without complications

## 2021-07-30 ENCOUNTER — TRANSCRIPTION ENCOUNTER (OUTPATIENT)
Age: 66
End: 2021-07-30

## 2021-07-30 VITALS
DIASTOLIC BLOOD PRESSURE: 74 MMHG | OXYGEN SATURATION: 95 % | HEART RATE: 56 BPM | TEMPERATURE: 97 F | SYSTOLIC BLOOD PRESSURE: 138 MMHG | RESPIRATION RATE: 19 BRPM

## 2021-07-30 PROBLEM — I21.4 NON-ST ELEVATION (NSTEMI) MYOCARDIAL INFARCTION: Chronic | Status: ACTIVE | Noted: 2021-07-28

## 2021-07-30 LAB
ALBUMIN SERPL ELPH-MCNC: 3.8 G/DL — SIGNIFICANT CHANGE UP (ref 3.3–5.2)
ALP SERPL-CCNC: 78 U/L — SIGNIFICANT CHANGE UP (ref 40–120)
ALT FLD-CCNC: 15 U/L — SIGNIFICANT CHANGE UP
ANION GAP SERPL CALC-SCNC: 9 MMOL/L — SIGNIFICANT CHANGE UP (ref 5–17)
AST SERPL-CCNC: 20 U/L — SIGNIFICANT CHANGE UP
BILIRUB SERPL-MCNC: 1.2 MG/DL — SIGNIFICANT CHANGE UP (ref 0.4–2)
BUN SERPL-MCNC: 12.2 MG/DL — SIGNIFICANT CHANGE UP (ref 8–20)
CALCIUM SERPL-MCNC: 8.6 MG/DL — SIGNIFICANT CHANGE UP (ref 8.6–10.2)
CHLORIDE SERPL-SCNC: 105 MMOL/L — SIGNIFICANT CHANGE UP (ref 98–107)
CO2 SERPL-SCNC: 25 MMOL/L — SIGNIFICANT CHANGE UP (ref 22–29)
CREAT SERPL-MCNC: 1.01 MG/DL — SIGNIFICANT CHANGE UP (ref 0.5–1.3)
GLUCOSE SERPL-MCNC: 101 MG/DL — HIGH (ref 70–99)
HCT VFR BLD CALC: 41 % — SIGNIFICANT CHANGE UP (ref 39–50)
HGB BLD-MCNC: 12.6 G/DL — LOW (ref 13–17)
MCHC RBC-ENTMCNC: 27.5 PG — SIGNIFICANT CHANGE UP (ref 27–34)
MCHC RBC-ENTMCNC: 30.7 GM/DL — LOW (ref 32–36)
MCV RBC AUTO: 89.3 FL — SIGNIFICANT CHANGE UP (ref 80–100)
PLATELET # BLD AUTO: 178 K/UL — SIGNIFICANT CHANGE UP (ref 150–400)
POTASSIUM SERPL-MCNC: 4.5 MMOL/L — SIGNIFICANT CHANGE UP (ref 3.5–5.3)
POTASSIUM SERPL-SCNC: 4.5 MMOL/L — SIGNIFICANT CHANGE UP (ref 3.5–5.3)
PROT SERPL-MCNC: 6.7 G/DL — SIGNIFICANT CHANGE UP (ref 6.6–8.7)
RBC # BLD: 4.59 M/UL — SIGNIFICANT CHANGE UP (ref 4.2–5.8)
RBC # FLD: 12.8 % — SIGNIFICANT CHANGE UP (ref 10.3–14.5)
SODIUM SERPL-SCNC: 139 MMOL/L — SIGNIFICANT CHANGE UP (ref 135–145)
WBC # BLD: 6.77 K/UL — SIGNIFICANT CHANGE UP (ref 3.8–10.5)
WBC # FLD AUTO: 6.77 K/UL — SIGNIFICANT CHANGE UP (ref 3.8–10.5)

## 2021-07-30 PROCEDURE — 93571 IV DOP VEL&/PRESS C FLO 1ST: CPT | Mod: LD

## 2021-07-30 PROCEDURE — 80053 COMPREHEN METABOLIC PANEL: CPT

## 2021-07-30 PROCEDURE — C1769: CPT

## 2021-07-30 PROCEDURE — 85027 COMPLETE CBC AUTOMATED: CPT

## 2021-07-30 PROCEDURE — 99152 MOD SED SAME PHYS/QHP 5/>YRS: CPT

## 2021-07-30 PROCEDURE — 86901 BLOOD TYPING SEROLOGIC RH(D): CPT

## 2021-07-30 PROCEDURE — 99232 SBSQ HOSP IP/OBS MODERATE 35: CPT

## 2021-07-30 PROCEDURE — 92978 ENDOLUMINL IVUS OCT C 1ST: CPT | Mod: LC

## 2021-07-30 PROCEDURE — C9600: CPT | Mod: LC

## 2021-07-30 PROCEDURE — 85610 PROTHROMBIN TIME: CPT

## 2021-07-30 PROCEDURE — 86850 RBC ANTIBODY SCREEN: CPT

## 2021-07-30 PROCEDURE — 80061 LIPID PANEL: CPT

## 2021-07-30 PROCEDURE — 93454 CORONARY ARTERY ANGIO S&I: CPT | Mod: XU

## 2021-07-30 PROCEDURE — C1725: CPT

## 2021-07-30 PROCEDURE — C1874: CPT

## 2021-07-30 PROCEDURE — 99153 MOD SED SAME PHYS/QHP EA: CPT

## 2021-07-30 PROCEDURE — C1887: CPT

## 2021-07-30 PROCEDURE — 85025 COMPLETE CBC W/AUTO DIFF WBC: CPT

## 2021-07-30 PROCEDURE — C1894: CPT

## 2021-07-30 PROCEDURE — 93010 ELECTROCARDIOGRAM REPORT: CPT

## 2021-07-30 PROCEDURE — C1753: CPT

## 2021-07-30 PROCEDURE — 86900 BLOOD TYPING SEROLOGIC ABO: CPT

## 2021-07-30 PROCEDURE — 93005 ELECTROCARDIOGRAM TRACING: CPT

## 2021-07-30 PROCEDURE — 36415 COLL VENOUS BLD VENIPUNCTURE: CPT

## 2021-07-30 RX ORDER — CLOPIDOGREL BISULFATE 75 MG/1
1 TABLET, FILM COATED ORAL
Qty: 90 | Refills: 1
Start: 2021-07-30 | End: 2022-01-25

## 2021-07-30 RX ORDER — METOPROLOL TARTRATE 50 MG
1 TABLET ORAL
Qty: 90 | Refills: 0
Start: 2021-07-30 | End: 2021-10-27

## 2021-07-30 RX ORDER — METOPROLOL TARTRATE 50 MG
1 TABLET ORAL
Qty: 0 | Refills: 0 | DISCHARGE

## 2021-07-30 RX ORDER — ASPIRIN/CALCIUM CARB/MAGNESIUM 324 MG
1 TABLET ORAL
Qty: 30 | Refills: 0
Start: 2021-07-30 | End: 2021-08-28

## 2021-07-30 RX ORDER — ATORVASTATIN CALCIUM 80 MG/1
1 TABLET, FILM COATED ORAL
Qty: 90 | Refills: 1
Start: 2021-07-30 | End: 2022-01-25

## 2021-07-30 RX ADMIN — Medication 81 MILLIGRAM(S): at 09:04

## 2021-07-30 RX ADMIN — Medication 25 MILLIGRAM(S): at 06:32

## 2021-07-30 RX ADMIN — CLOPIDOGREL BISULFATE 75 MILLIGRAM(S): 75 TABLET, FILM COATED ORAL at 09:04

## 2021-07-30 NOTE — PROGRESS NOTE ADULT - SUBJECTIVE AND OBJECTIVE BOX
Department of Cardiology                                                                  [facility]/[place] [address] [address] [facility]                                                            Telephone: [phone]. Fax:[phone]                                                    Post- Procedure Note: Left Heart Cardiac Catheterization       Narrative:  66y  Male is now s/p left heart catheterization via (right or left radial or groin) approach with  ___ :    LM: Normal   LAD: 60% will have Medically managed   LCX: 95 %  received 1 MARY  RCA: Non Obstructive           HPI:  HPI: 64 yo M with no significant PMHx prior to 6/28/2020  when he presented to [facility] ED with C/O CP. He is an  Ex- smoker ( smoked x 20 yrs, pack lasting x 1 week, quit 5 yrs ago) who presented on 6/28/2020 with chest pain since 1 mos ago, with worsening in intensity over past 2 days. Pt described pain as burning, worse after eating, and sometimes with exertion. Pt states that pain is anterior, non radiating, non pleuritic. Pt states that pain is somewhat relieved with antacids. Pt also c/o constipation. + nausea, intermittent. + early satiety.  Pt denies any SOB/dizziness/syncope/ diaphoresis/palpitations/F/C/cough/sick contacts/recent travelling/other complaints. He spilled troponins  at that time , initial troponin 0.07, and he was scheduled to have a C on 6/29 but refused.  He was started on ASA and plavix that admission.  He returned to the cardiologist with continued complaints of burning in the chest with occasional palpitations.  He was sent for an echocardiogram and scheduled again for a cardiac catheterization which he states he will now do.     No Known Allergies      Objective:  Vital Signs Last 24 Hrs  T(C): 36.5 (30 Jul 2021 05:52), Max: 36.9 (29 Jul 2021 14:20)  T(F): 97.7 (30 Jul 2021 05:52), Max: 98.5 (29 Jul 2021 14:20)  HR: 57 (30 Jul 2021 06:29) (47 - 68)  BP: 135/81 (30 Jul 2021 06:29) (129/76 - 179/79)  BP(mean): 98 (29 Jul 2021 14:20) (98 - 98)  RR: 20 (30 Jul 2021 06:29) (16 - 20)  SpO2: 98% (29 Jul 2021 22:10) (96% - 100%)    CM: SR  Neuro: A&OX3, CN 2-12 intact  HEENT: NC, AT  Lungs: CTA B/L  CV: S1, S2, no murmur, RRR  Abd: Soft  Right Groin: Soft, no bleeding, no hematoma  Extremity: + distal pulses  EKG:  Pending                         12.6   6.77  )-----------( 178      ( 30 Jul 2021 05:37 )             41.0     07-30    139  |  105  |  12.2  ----------------------------<  101<H>  4.5   |  25.0  |  1.01    Ca    8.6      30 Jul 2021 05:37    TPro  6.7  /  Alb  3.8  /  TBili  1.2  /  DBili  x   /  AST  20  /  ALT  15  /  AlkPhos  78  07-30    PT/INR - ( 29 Jul 2021 09:26 )   PT: 11.8 sec;   INR: 1.02 ratio               66y  Male is now s/p left heart catheterization via Right radial 1 MARY to LCX    Plan   Ok to DC home after EKG is Reviewed   Labs reviewed   reviewed post care instructions with pt via   will continue with Toprol xl 25mg QD  Plavix 75 QD   Aspirin 81mg QD  I increased his Atorvastatin to 40mg QD   His daughter was also explained the care plan   They will call for Follow up  care today from home                                                                           Department of Cardiology                                                                  [facility]/[place] [address] [address] [facility]                                                            Telephone: [phone]. Fax:[phone]                                                    Post- Procedure Note: Left Heart Cardiac Catheterization       Narrative:  66y  Male is now s/p left heart catheterization via right radial 1 MARY to the LCX.     LM: Normal   LAD: 60% will have Medically managed   LCX: 95 %  received 1 MARY  RCA: Non Obstructive           HPI:  HPI: 66 yo M with no significant PMHx prior to 6/28/2020  when he presented to [facility] ED with C/O CP. He is an  Ex- smoker ( smoked x 20 yrs, pack lasting x 1 week, quit 5 yrs ago) who presented on 6/28/2020 with chest pain since 1 mos ago, with worsening in intensity over past 2 days. Pt described pain as burning, worse after eating, and sometimes with exertion. Pt states that pain is anterior, non radiating, non pleuritic. Pt states that pain is somewhat relieved with antacids. Pt also c/o constipation. + nausea, intermittent. + early satiety.  Pt denies any SOB/dizziness/syncope/ diaphoresis/palpitations/F/C/cough/sick contacts/recent travelling/other complaints. He spilled troponins  at that time , initial troponin 0.07, and he was scheduled to have a C on 6/29 but refused.  He was started on ASA and plavix that admission.  He returned to the cardiologist with continued complaints of burning in the chest with occasional palpitations.  He was sent for an echocardiogram and scheduled again for a cardiac catheterization which he states he will now do.     No Known Allergies      Objective:  Vital Signs Last 24 Hrs  T(C): 36.5 (30 Jul 2021 05:52), Max: 36.9 (29 Jul 2021 14:20)  T(F): 97.7 (30 Jul 2021 05:52), Max: 98.5 (29 Jul 2021 14:20)  HR: 57 (30 Jul 2021 06:29) (47 - 68)  BP: 135/81 (30 Jul 2021 06:29) (129/76 - 179/79)  BP(mean): 98 (29 Jul 2021 14:20) (98 - 98)  RR: 20 (30 Jul 2021 06:29) (16 - 20)  SpO2: 98% (29 Jul 2021 22:10) (96% - 100%)    CM: SR  Neuro: A&OX3, CN 2-12 intact  HEENT: NC, AT  Lungs: CTA B/L  CV: S1, S2, no murmur, RRR  Abd: Soft  Right Groin: Soft, no bleeding, no hematoma  Extremity: + distal pulses  EKG:  RSR No changes from baseline                         12.6   6.77  )-----------( 178      ( 30 Jul 2021 05:37 )             41.0     07-30    139  |  105  |  12.2  ----------------------------<  101<H>  4.5   |  25.0  |  1.01    Ca    8.6      30 Jul 2021 05:37    TPro  6.7  /  Alb  3.8  /  TBili  1.2  /  DBili  x   /  AST  20  /  ALT  15  /  AlkPhos  78  07-30    PT/INR - ( 29 Jul 2021 09:26 )   PT: 11.8 sec;   INR: 1.02 ratio               66y  Male is now s/p left heart catheterization via Right radial 1 MARY to LCX    Plan   Ok to DC home after EKG is Reviewed   Labs reviewed   reviewed post care instructions with pt via   will continue with Toprol xl 25mg QD  Plavix 75 QD   Aspirin 81mg QD  I increased his Atorvastatin to 40mg QD   His daughter was also explained the care plan   They will call for Follow up  care today from home

## 2021-07-30 NOTE — DISCHARGE NOTE NURSING/CASE MANAGEMENT/SOCIAL WORK - PATIENT PORTAL LINK FT
You can access the FollowMyHealth Patient Portal offered by [facility] by registering at the following website: [email or web address]. By joining [employer]’s FollowMyHealth portal, you will also be able to view your health information using other applications (apps) compatible with our system.

## 2021-08-03 ENCOUNTER — NON-APPOINTMENT (OUTPATIENT)
Age: 66
End: 2021-08-03

## 2021-08-03 ENCOUNTER — APPOINTMENT (OUTPATIENT)
Dept: CARDIOLOGY | Facility: CLINIC | Age: 66
End: 2021-08-03
Payer: MEDICAID

## 2021-08-03 VITALS
BODY MASS INDEX: 24.11 KG/M2 | HEIGHT: 66 IN | TEMPERATURE: 98.5 F | OXYGEN SATURATION: 99 % | HEART RATE: 51 BPM | SYSTOLIC BLOOD PRESSURE: 151 MMHG | DIASTOLIC BLOOD PRESSURE: 64 MMHG | WEIGHT: 150 LBS

## 2021-08-03 PROCEDURE — 93000 ELECTROCARDIOGRAM COMPLETE: CPT

## 2021-08-03 PROCEDURE — 99215 OFFICE O/P EST HI 40 MIN: CPT

## 2021-08-03 RX ORDER — ASPIRIN 81 MG/1
81 TABLET, CHEWABLE ORAL
Qty: 60 | Refills: 2 | Status: DISCONTINUED | COMMUNITY
End: 2021-08-03

## 2021-09-19 NOTE — ED PROVIDER NOTE - NSCAREINITIATED _GEN_ER
[facility] PATIENT CONSULTATION NOTE    HPI:  Bernabe Grullon is a 66 year old year old male with no known medical history presented to the ER with slurred speech around noon yesterday.  Symptoms resolved in the ER and he has no residual deficits.  He underwent CTA that showed high-grade left ICA stenosis with calcific plaque and mural thrombus greater than 95% and MRI findings of a left subacute parietal infarct.  He states he had an ultrasound that showed he had less than 50% narrowing in [place] 6 months ago.  Does not take a baby aspirin or statin.  No history of tobacco use.    Review Of Systems:   General: Denies F/C  Respiratory: Denies SOB  Cardio: Denies CP  Gastrointestinal: Denies N/V  Genitourinary: Denies recent change in urination  Musculoskeletal: See HPI  Neurologic: Denies HA  Psychiatric: Denies confusion  Hematology/immunology: no unexpected bruising    PAST MEDICAL HISTORY:  Past Medical History:   Diagnosis Date     Carotid stenosis        PAST SURGICAL HISTORY:  History reviewed. No pertinent surgical history.    FAMILY HISTORY:  Family History   Problem Relation Age of Onset     Cerebrovascular Disease Mother        SOCIAL HISTORY:   Social History     Tobacco Use     Smoking status: Never Smoker     Smokeless tobacco: Never Used   Substance Use Topics     Alcohol use: Yes     Comment: 2 drinks daiy      HOME MEDICATIONS:  Prior to Admission medications    Medication Sig Start Date End Date Taking? Authorizing Provider   Ascorbic Acid 500 MG CHEW Take 500 mg by mouth daily    Yes Reported, Patient   aspirin (ASA) 81 MG EC tablet Take 81 mg by mouth daily  3/2/21  Yes Reported, Patient   ibuprofen (ADVIL/MOTRIN) 200 MG tablet Take 200 mg by mouth every 4 hours as needed for mild pain   Yes Unknown, Entered By History       VITAL SIGNS:  BP (!) 147/87 (BP Location: Left arm)   Pulse 53   Temp 98.1  F (36.7  C) (Oral)   Resp 16   SpO2 97%     Intake/Output Summary (Last 24  hours) at 9/19/2021 0747  Last data filed at 9/19/2021 0436  Gross per 24 hour   Intake --   Output 1220 ml   Net -1220 ml       Labs:  ROUTINE IP LABS (Last four results)  BMP  Recent Labs   Lab 09/19/21  0533 09/19/21  0050 09/18/21  2234 09/18/21  1225 09/18/21  1222   NA  --   --   --   --  138   POTASSIUM  --   --   --   --  4.4   CHLORIDE  --   --   --   --  106   CHRIS  --   --   --   --  9.0   CO2  --   --   --   --  25   BUN  --   --   --   --  23*   CR  --   --   --  1.1 1.10   * 124* 143*  --  116     CBC  Recent Labs   Lab 09/18/21  1222   WBC 5.9   RBC 4.62   HGB 14.4   HCT 42.0   MCV 91   MCH 31.2   MCHC 34.3   RDW 11.9        INR  Recent Labs   Lab 09/18/21  1222   INR 0.98       PHYSICAL EXAM:  Constitutional: healthy, alert, no acute distress and cooperative   Cardiovascular: RRR  Respiratory: CTAB anteriorly, breathing unlabored without secondary muscle use  Psychiatric: mentation appears normal and affect normal/bright  Neck: no asymmetry  GI/Abdomen: +BS, abdomen soft, non-tender. No masses, no CVAT  MSK: able to move all extremities without weakness or ataxia  Extremities: no open lesions, extremities warm and well perfused   Neuro: tongue midline, no facial drooping, 5/5 strength bilaterally upper and lower extremities, no sensory loss    IMAGING:  IMPRESSION:   HEAD CT:  1.  No CT evidence for acute intracranial process.  2.  Brain atrophy and presumed chronic microvascular ischemic changes as above.     HEAD CTA:   1.  No significant stenosis, aneurysm, or high flow vascular malformation identified.  2.  Variant J[place] of Valerio anatomy as above.     NECK CTA:  1.  There is luminal low-attenuation within the proximal left internal carotid artery just distal to its origin favored reflect mural and/or intravascular thrombus within severe high-grade narrowing (greater than 95%) or less likely segmental occlusion.   There is markedly diminished caliber of the left cervical ICA distal  to this with improved vascular caliber of the cavernous and supraclinoid left ICA.    Patient Active Problem List   Diagnosis     Acute ischemic stroke (H)       ASSESSMENT:  66 year old year old male with no known medical history w/ slurred speech (resolved), found to have left parietal infarct and greater than 95% left ICA mixed calcific and thrombotic plaque stenosis concerning for symptomatic left carotid stenosis, possibly occluded.  Echo negative for thrombus.    PLAN:  Cerebral angiogram to ensure the ICA is not occluded  Plan for left CEA on Tuesday  Diet as tolerated  Continue ASA/statin, probable Plavix  Appreciate neurology recs  Medical management per primary    Discussed with Dr. Brito.    Starr Brown MD  Vascular Surgery Fellow  Pager: [phone]    STAFF: Patient seen with Dr. Brown.  Left hemispheric TIA with complete resolution.  Known left carotid bifurcation disease.  By CTA would expect very high-grade stenosis of the proximal left ICA just beyond its origin.  It appears that the ICA is patent distally.  If this is a situation be a candidate for a left CEA to prevent recurrent issues.    MRI confirms small left hemispheric strokes consistent with symptoms.    PMH: Very healthy.  No history of hypertension or diabetes.  Unsure of cholesterol level but on no medications.  Never smoked.  Very active.  No chest pain/shortness of breath/claudication symptoms.    Alert and appropriate.  Vital signs stable.  Wife and son are here.  HEENT= normal good range of motion..   Chest= clear to auscultatiion  Cardiovascular regular rate with no murmur.  Extremities= unremarkable.  No edema.  Normal pedal pulses bilaterally.    Morning laboratory: Serum creatinine= 0.94 LDL= 139 Hgb= 15.7    Reviewed CTA images with patient and family.  Minimal right carotid bifurcation disease.  Suspect focal proximal left ICA stenosis but patent distally.    Impression: Symptomatic left carotid stenosis.  Very likely this  is patent but would like to confirm with vertebral angiogram that perhaps can be performed today.  Has been initiated on aspirin/Plavix.  If ICA is patent would recommend left CEA and this is been discussed with patient and his family.  Risk benefits reviewed at length.  Continue on aspirin/Plavix.     Also discussed the importance of risk factor.  Has never smoked.  Does have an LDL well above our goal of less than 70 and statin should be initiated and this was discussed with he and his family.      35 minutes with patient today.  Puma Victor MD                   Mando Florian(Attending)

## 2021-09-29 ENCOUNTER — APPOINTMENT (OUTPATIENT)
Dept: CARDIOLOGY | Facility: CLINIC | Age: 66
End: 2021-09-29

## 2021-11-24 ENCOUNTER — APPOINTMENT (OUTPATIENT)
Dept: CARDIOLOGY | Facility: CLINIC | Age: 66
End: 2021-11-24

## 2021-11-29 NOTE — ED ADULT TRIAGE NOTE - RESPIRATORY RATE (BREATHS/MIN)
18 Calcipotriene Pregnancy And Lactation Text: This medication has not been proven safe during pregnancy. It is unknown if this medication is excreted in breast milk.

## 2021-12-22 ENCOUNTER — APPOINTMENT (OUTPATIENT)
Dept: CARDIOLOGY | Facility: CLINIC | Age: 66
End: 2021-12-22
Payer: MEDICAID

## 2021-12-22 ENCOUNTER — NON-APPOINTMENT (OUTPATIENT)
Age: 66
End: 2021-12-22

## 2021-12-22 VITALS
BODY MASS INDEX: 26.68 KG/M2 | TEMPERATURE: 97.9 F | SYSTOLIC BLOOD PRESSURE: 133 MMHG | OXYGEN SATURATION: 97 % | WEIGHT: 166 LBS | DIASTOLIC BLOOD PRESSURE: 73 MMHG | RESPIRATION RATE: 15 BRPM | HEIGHT: 66 IN | HEART RATE: 51 BPM

## 2021-12-22 DIAGNOSIS — R73.03 PREDIABETES.: ICD-10-CM

## 2021-12-22 PROCEDURE — 93000 ELECTROCARDIOGRAM COMPLETE: CPT

## 2021-12-22 PROCEDURE — 99215 OFFICE O/P EST HI 40 MIN: CPT

## 2022-04-07 ENCOUNTER — RX RENEWAL (OUTPATIENT)
Age: 67
End: 2022-04-07

## 2022-04-19 ENCOUNTER — APPOINTMENT (OUTPATIENT)
Dept: CARDIOLOGY | Facility: CLINIC | Age: 67
End: 2022-04-19

## 2022-05-02 ENCOUNTER — RX CHANGE (OUTPATIENT)
Age: 67
End: 2022-05-02

## 2022-05-20 ENCOUNTER — RX RENEWAL (OUTPATIENT)
Age: 67
End: 2022-05-20

## 2022-05-31 NOTE — H&P ADULT - PROBLEM SELECTOR PROBLEM 1
NSTEMI (non-ST elevated myocardial infarction)

## 2022-06-13 ENCOUNTER — RX CHANGE (OUTPATIENT)
Age: 67
End: 2022-06-13

## 2022-06-22 ENCOUNTER — APPOINTMENT (OUTPATIENT)
Dept: CARDIOLOGY | Facility: CLINIC | Age: 67
End: 2022-06-22

## 2022-07-05 ENCOUNTER — APPOINTMENT (OUTPATIENT)
Dept: CARDIOLOGY | Facility: CLINIC | Age: 67
End: 2022-07-05

## 2022-07-05 ENCOUNTER — NON-APPOINTMENT (OUTPATIENT)
Age: 67
End: 2022-07-05

## 2022-07-05 VITALS
DIASTOLIC BLOOD PRESSURE: 73 MMHG | TEMPERATURE: 98.6 F | HEIGHT: 66 IN | HEART RATE: 75 BPM | OXYGEN SATURATION: 97 % | BODY MASS INDEX: 26.03 KG/M2 | WEIGHT: 162 LBS | SYSTOLIC BLOOD PRESSURE: 123 MMHG

## 2022-07-05 DIAGNOSIS — R42 DIZZINESS AND GIDDINESS: ICD-10-CM

## 2022-07-05 DIAGNOSIS — K21.9 GASTRO-ESOPHAGEAL REFLUX DISEASE W/OUT ESOPHAGITIS: ICD-10-CM

## 2022-07-05 PROCEDURE — 99215 OFFICE O/P EST HI 40 MIN: CPT

## 2022-07-05 PROCEDURE — 99072 ADDL SUPL MATRL&STAF TM PHE: CPT

## 2022-07-05 PROCEDURE — 93000 ELECTROCARDIOGRAM COMPLETE: CPT

## 2022-07-05 RX ORDER — ATORVASTATIN CALCIUM 40 MG/1
40 TABLET, FILM COATED ORAL
Refills: 0 | Status: DISCONTINUED | COMMUNITY
Start: 2021-07-30 | End: 2022-07-05

## 2022-07-05 RX ORDER — CLOPIDOGREL BISULFATE 75 MG/1
75 TABLET, FILM COATED ORAL DAILY
Qty: 90 | Refills: 3 | Status: DISCONTINUED | COMMUNITY
Start: 2021-07-06 | End: 2022-07-05

## 2022-07-12 NOTE — CARDIOLOGY SUMMARY
[de-identified] : 7 5 2022  Sinus  Rhythm \par -RSR(V1) -nondiagnostic. \par  -  Nonspecific T-abnormality. \par \par ABNORMAL \par \par 12/22/2021: Sinus  Bradycardia \par -RSR(V1) -nondiagnostic. \par  -  Nonspecific T-abnormality. \par \par 08/03/2021 Sinus bradycardia, no acute ST T changes [de-identified] : 06/15/2021 LVEF 65-70%, Grade I DD, Mild MR, normal RV size and function [de-identified] : Cath 7/29/2021= LAD 60%, RCA mid 40% rPDA 50%, 90% stenosis of mid circumflex= 1 MARY to circumflex. [de-identified] : 07/02/2021, , TG 80, HDL 41, LDL 46 Detail Level: Zone Detail Level: Generalized

## 2022-07-12 NOTE — PHYSICAL EXAM
[Well Nourished] : well nourished [No Acute Distress] : no acute distress [No Carotid Bruit] : no carotid bruit [Normal S1, S2] : normal S1, S2 [No Rub] : no rub [No Gallop] : no gallop [Bradycardia] : bradycardic [Rhythm Regular] : regular [I] : a grade 1 [Clear Lung Fields] : clear lung fields [No Respiratory Distress] : no respiratory distress  [Normal Bowel Sounds] : normal bowel sounds [Normal Gait] : normal gait [No Edema] : no edema [Normal Radial B/L] : normal radial B/L [Normal PT B/L] : normal PT B/L [Moves all extremities] : moves all extremities [Normal Speech] : normal speech [Alert and Oriented] : alert and oriented [Normal memory] : normal memory [de-identified] : Deferred- patient wearing a mask

## 2022-07-12 NOTE — HISTORY OF PRESENT ILLNESS
[FreeTextEntry1] : reason:  hypertension and coronary artery disease\par \par HPI for today: :  he does have gastric pain. epigastric pain .  he walks 2-3 flights of stairs . he fels weak. but no pain .\par its going on  2 mths .    its there all the time.  no weight loss. no loss of apetite. no nausea or vomiting. \par no dyspnea. no dizziness.  \par when he had stents put in he had different symptoms.  a\par \par \par old note: 66 year old male former smoker from [facility] with a history of HTN, CAD (s/p 1 MARY to circumflex 7/2021, 60% stenosis of mid LAD), and borderline type 2 diabetes who presents to the office for follow up of CAD. Reports feeling well today. He is accompanied by his granddaughter, Taina, who is assisting with Tajik-English translation today. Last office visit he was advised to establish care with a PCP to manage prediabetes and elevated TSH; he has not found a PCP yet. Denies chest pain, SOB at rest, MONGE, palpitations, lightheadedness, dizziness, fatigue, syncope, near syncope and LE edema. He walks most days of the week for about an hour leisurely in the neighborhood without chest pain or SOB. Denies current smoking, alcohol or illicit drug use. He has not needed to use Nitro since last visit.

## 2022-07-12 NOTE — REVIEW OF SYSTEMS
[Fever] : no fever [Headache] : no headache [Chills] : no chills [Feeling Fatigued] : not feeling fatigued [SOB] : no shortness of breath [Dyspnea on exertion] : not dyspnea during exertion [Chest Discomfort] : no chest discomfort [Lower Ext Edema] : no extremity edema [Leg Claudication] : no intermittent leg claudication [Palpitations] : no palpitations [Orthopnea] : no orthopnea [PND] : no PND [Syncope] : no syncope [Cough] : no cough [Wheezing] : no wheezing [Abdominal Pain] : no abdominal pain [Nausea] : no nausea [Vomiting] : no vomiting [Heartburn] : no heartburn [Myalgia] : no myalgia [Dizziness] : no dizziness [Numbness (Hypoesthesia)] : no numbness [Tingling (Paresthesia)] : no tingling [Limb Weakness (Paresis)] : no limb weakness (Paresis) [Confusion] : no confusion was observed [Memory Lapses Or Loss] : no memory lapses or loss [Easy Bleeding] : no tendency for easy bleeding [Easy Bruising] : no tendency for easy bruising

## 2022-07-12 NOTE — REASON FOR VISIT
[Coronary Artery Disease] : coronary artery disease [FreeTextEntry1] : reason:  hypertension and coronary artery disease

## 2022-07-12 NOTE — DISCUSSION/SUMMARY
[With Me] : with me [EKG obtained to assist in diagnosis and management of assessed problem(s)] : EKG obtained to assist in diagnosis and management of assessed problem(s) [Patient] : the patient [Risks] : risks [Benefits] : benefits [Alternatives] : alternatives [___ Month(s)] : in [unfilled] month(s) [FreeTextEntry1] : 67  year old male former smoker from [facility] with a history of HTN, CAD (s/p 1 MARY to circumflex 7/2021, 60% stenosis of mid LAD), and borderline type 2 diabetes who presents to the office for follow up of CAD. Reports feeling well today. He is accompanied by his granddaughter, Taina, who is assisting with Greenlandic-English translation today. Last office visit he was advised to establish care with a PCP to manage prediabetes and elevated TSH; he has not found a PCP yet. Denies chest pain, SOB at rest, MONGE, palpitations, lightheadedness, dizziness, fatigue, syncope, near syncope and LE edema. He walks most days of the week for about an hour leisurely in the neighborhood without chest pain or SOB. Denies current smoking, alcohol or illicit drug use. \par \par \par Plan:\par 1.  Epigastric pain: Likely GERD.  Pantoprazole 40 mg daily for 2 weeks. and after the PRN. iof symptoms persist then Gi.  he should also follow up with PCP. \par transthoracic echocardiogram a\par 2. CAD s/p MARY: Continue Aspirin,  Statin, and Toprol 25mg Daily.  routine blood work.  stop plavix. \par 3  HTN: BP stable. Continue Lisinopril 2.5 mg daily\par 4. HLD:   Continue Atorvastatin 40mg Daily.  \par Will order and review ECG for the above mentioned diagnosis/condition/symptoms  a \par  \par  \par

## 2022-08-01 ENCOUNTER — APPOINTMENT (OUTPATIENT)
Dept: CARDIOLOGY | Facility: CLINIC | Age: 67
End: 2022-08-01

## 2022-11-02 NOTE — ED ADULT TRIAGE NOTE - TEMPERATURE IN FAHRENHEIT (DEGREES F)
11/02/22 1050   Appointment Info   Signing Clinician's Name / Credentials (PT) Virginia Muniz,PT       Present no   Living Environment   People in Home spouse   Current Living Arrangements house  (ablet o live on one level)   Home Accessibility stairs to enter home   Stair Railings, Main Entrance other (see comments)  (with railing)   Living Environment Comments possibly I with ADLs, but A with IADLS, pt nodding yes/no to question with occassionaly verbalization,but very sleepy   Self-Care   Equipment Currently Used at Home cane, straight;walker, rolling  (normally no AD)   General Information   Onset of Illness/Injury or Date of Surgery 11/01/22   Referring Physician Dr. Faviola Jhaveri   Patient/Family Therapy Goals Statement (PT) none stated   Pertinent History of Current Problem (include personal factors and/or comorbidities that impact the POC) admit SOB, fatigue- plueral effusion, pt has h/o lung CA currently on cheno therapy, HTN plueral effusion   General Observations pt sleeping upright in bed in ER   Cognition   Affect/Mental Status (Cognition) unable/difficult to assess;other (see comments)  (sleepy)   Pain Assessment   Patient Currently in Pain No   Range of Motion (ROM)   ROM Comment limited ROM BLE in bed   Strength (Manual Muscle Testing)   Strength Comments BLE generalized weakness   Bed Mobility   Impairments Contributing to Impaired Bed Mobility decreased strength;other (see comments)  (fatigue)   Assistive Device (Bed Mobility) other (see comments)  (HOB elevated and HHA)   Comment, (Bed Mobility) modAx1   Transfers   Comment, (Transfers) unable to attempt, pt not able to keep eyes open and reporting dizziness with sitting   Balance   Balance Comments SBA/CGA for sitting balance   Clinical Impression   Criteria for Skilled Therapeutic Intervention Yes, treatment indicated   PT Diagnosis (PT) decreased mobility   Influenced by the following impairments fatigue, weakness,  dizziness, decreased alertness   Functional limitations due to impairments bed mob, trasnfers, gait   Clinical Presentation (PT Evaluation Complexity) Evolving/Changing   Clinical Presentation Rationale presents as medically diagnosed   Clinical Decision Making (Complexity) moderate complexity   Planned Therapy Interventions (PT) bed mobility training;transfer training;stretching;gait training   Anticipated Equipment Needs at Discharge (PT) walker, rolling;wheelchair   Risk & Benefits of therapy have been explained evaluation/treatment results reviewed   PT Total Evaluation Time   PT Eval, Moderate Complexity Minutes (13041) 10   Physical Therapy Goals   PT Frequency 3x/week   PT Predicted Duration/Target Date for Goal Attainment 11/10/22   PT Goals Bed Mobility;Transfers   PT: Bed Mobility Minimal assist;Supine to/from sit;Rolling   PT: Transfers Moderate assist;Bed to/from chair;Assistive device;Sit to/from stand   Interventions   Interventions Quick Adds Therapeutic Activity   Therapeutic Activity   Therapeutic Activities: dynamic activities to improve functional performance Minutes (55302) 8   Symptoms Noted During/After Treatment Fatigue;Dizziness   Treatment Detail/Skilled Intervention sat EOB m2urbeehy SBA/CGA, pt unable to open eyes, reported dizziness when asked, supine >sit modAx1, scooting up and bed and repositioning maxAX2, pt very tired   PT Discharge Planning   PT Plan LE ex, bed mob, progress to sit<>stand w/ FWW and then gait   PT Discharge Recommendation (DC Rec) Transitional Care Facility;home with assist;home with home care physical therapy   PT Rationale for DC Rec pt unable ot tolerated transfers ofgait at time of eval recommend TCU for futher rehab if home would need 24 hands on modAx1 .w/c .FWW and home PT   PT Brief overview of current status pt mod Ax1  for bed mob, CGA/SBA sitting balance   Total Session Time   Timed Code Treatment Minutes 8   Total Session Time (sum of timed and untimed  services) 18      98.2

## 2022-12-21 ENCOUNTER — OFFICE (OUTPATIENT)
Dept: URBAN - METROPOLITAN AREA CLINIC 6 | Facility: CLINIC | Age: 67
Setting detail: OPHTHALMOLOGY
End: 2022-12-21
Payer: COMMERCIAL

## 2022-12-21 DIAGNOSIS — H40.013: ICD-10-CM

## 2022-12-21 DIAGNOSIS — Z96.1: ICD-10-CM

## 2022-12-21 DIAGNOSIS — H52.4: ICD-10-CM

## 2022-12-21 PROBLEM — H52.7 REFRACTIVE ERROR: Status: ACTIVE | Noted: 2022-12-21

## 2022-12-21 PROCEDURE — 92250 FUNDUS PHOTOGRAPHY W/I&R: CPT | Performed by: OPHTHALMOLOGY

## 2022-12-21 PROCEDURE — 92014 COMPRE OPH EXAM EST PT 1/>: CPT | Performed by: OPHTHALMOLOGY

## 2022-12-21 PROCEDURE — 92015 DETERMINE REFRACTIVE STATE: CPT | Performed by: OPHTHALMOLOGY

## 2022-12-21 ASSESSMENT — KERATOMETRY
METHOD_AUTO_MANUAL: AUTO
OS_AXISANGLE_DEGREES: 110
OD_K1POWER_DIOPTERS: 41.50
OS_K2POWER_DIOPTERS: 41.75
OS_K1POWER_DIOPTERS: 41.50
OD_K2POWER_DIOPTERS: 42.00
OD_AXISANGLE_DEGREES: 030

## 2022-12-21 ASSESSMENT — REFRACTION_AUTOREFRACTION
OD_SPHERE: +0.25
OS_SPHERE: 0.00
OS_CYLINDER: -0.50
OD_AXIS: 125
OD_CYLINDER: -0.50
OS_AXIS: 050

## 2022-12-21 ASSESSMENT — SPHEQUIV_DERIVED
OD_SPHEQUIV: 0
OD_SPHEQUIV: 0
OS_SPHEQUIV: -0.25
OD_SPHEQUIV: 0

## 2022-12-21 ASSESSMENT — REFRACTION_CURRENTRX
OD_VPRISM_DIRECTION: SV
OS_AXIS: 091
OD_SPHERE: -3.25
OS_VPRISM_DIRECTION: SV
OS_SPHERE: -3.00
OD_OVR_VA: 20/
OD_CYLINDER: 0.00
OS_CYLINDER: -0.25
OS_OVR_VA: 20/
OD_AXIS: 000

## 2022-12-21 ASSESSMENT — AXIALLENGTH_DERIVED
OD_AL: 24.2527
OS_AL: 24.4051
OD_AL: 24.2527
OD_AL: 24.2527

## 2022-12-21 ASSESSMENT — REFRACTION_MANIFEST
OD_AXIS: 125
OS_VA1: 20/25-2
OD_CYLINDER: -0.50
OS_AXIS: 050
OD_ADD: +2.50
OS_VA1: 20/25-2
OS_CYLINDER: -0.50
OD_CYLINDER: -0.50
OU_VA: 20/20-2
OS_AXIS: 050
OD_AXIS: 125
OS_CYLINDER: -0.50
OD_VA1: 20/25-2
OS_ADD: +2.50
OD_SPHERE: +0.25
OS_SPHERE: PLANO
OD_SPHERE: +0.25
OU_VA: 20/20-2
OS_SPHERE: PLANO
OD_VA1: 20/25-2

## 2022-12-21 ASSESSMENT — VISUAL ACUITY
OD_BCVA: 20/25-2
OS_BCVA: 20/30-2

## 2022-12-21 ASSESSMENT — TONOMETRY
OS_IOP_MMHG: 13
OD_IOP_MMHG: 13

## 2022-12-21 ASSESSMENT — CONFRONTATIONAL VISUAL FIELD TEST (CVF)
OD_FINDINGS: FULL
OS_FINDINGS: FULL

## 2022-12-21 ASSESSMENT — CORNEAL EDEMA CLINICAL DESCRIPTION: OD_CORNEALEDEMA: T

## 2023-01-20 ENCOUNTER — NON-APPOINTMENT (OUTPATIENT)
Age: 68
End: 2023-01-20

## 2023-01-20 ENCOUNTER — LABORATORY RESULT (OUTPATIENT)
Age: 68
End: 2023-01-20

## 2023-01-20 ENCOUNTER — APPOINTMENT (OUTPATIENT)
Dept: CARDIOLOGY | Facility: CLINIC | Age: 68
End: 2023-01-20
Payer: COMMERCIAL

## 2023-01-20 VITALS
TEMPERATURE: 98.3 F | RESPIRATION RATE: 14 BRPM | SYSTOLIC BLOOD PRESSURE: 128 MMHG | BODY MASS INDEX: 27.16 KG/M2 | HEIGHT: 66 IN | WEIGHT: 169 LBS | DIASTOLIC BLOOD PRESSURE: 64 MMHG | OXYGEN SATURATION: 98 % | HEART RATE: 75 BPM

## 2023-01-20 DIAGNOSIS — R53.83 OTHER FATIGUE: ICD-10-CM

## 2023-01-20 DIAGNOSIS — Z87.898 PERSONAL HISTORY OF OTHER SPECIFIED CONDITIONS: ICD-10-CM

## 2023-01-20 PROCEDURE — 99072 ADDL SUPL MATRL&STAF TM PHE: CPT

## 2023-01-20 PROCEDURE — 93000 ELECTROCARDIOGRAM COMPLETE: CPT

## 2023-01-20 NOTE — END OF VISIT
[FreeTextEntry3] : I personally discussed this patient with Nurse Practitioner/Physician Assistant. I agree with the assessment and plan as written, unless noted below.  [Time Spent: ___ minutes] : I have spent [unfilled] minutes of time on the encounter.

## 2023-01-20 NOTE — PHYSICAL EXAM
[Well Nourished] : well nourished [No Acute Distress] : no acute distress [No Carotid Bruit] : no carotid bruit [Normal S1, S2] : normal S1, S2 [No Rub] : no rub [No Gallop] : no gallop [Bradycardia] : bradycardic [Rhythm Regular] : regular [I] : a grade 1 [Clear Lung Fields] : clear lung fields [No Respiratory Distress] : no respiratory distress  [Normal Bowel Sounds] : normal bowel sounds [Normal Gait] : normal gait [No Edema] : no edema [Normal Radial B/L] : normal radial B/L [Normal PT B/L] : normal PT B/L [Moves all extremities] : moves all extremities [Normal Speech] : normal speech [Alert and Oriented] : alert and oriented [Normal memory] : normal memory [de-identified] : Deferred- patient wearing a mask

## 2023-01-20 NOTE — CARDIOLOGY SUMMARY
[de-identified] : 1 20 2023: sinus.  ST T changes in anterop alteral. \par new from prior \par 7 5 2022  Sinus  Rhythm \par -RSR(V1) -nondiagnostic. \par  -  Nonspecific T-abnormality. \par \par ABNORMAL \par \par 12/22/2021: Sinus  Bradycardia \par -RSR(V1) -nondiagnostic. \par  -  Nonspecific T-abnormality. \par \par 08/03/2021 Sinus bradycardia, no acute ST T changes [de-identified] : 06/15/2021 LVEF 65-70%, Grade I DD, Mild MR, normal RV size and function [de-identified] : Cath 7/29/2021= LAD 60%, RCA mid 40% rPDA 50%, 90% stenosis of mid circumflex= 1 MARY to circumflex. [de-identified] : 07/02/2021, , TG 80, HDL 41, LDL 46

## 2023-01-20 NOTE — DISCUSSION/SUMMARY
[Patient] : the patient [Risks] : risks [Benefits] : benefits [Alternatives] : alternatives [With Me] : with me [___ Month(s)] : in [unfilled] month(s) [FreeTextEntry1] : 67  year old male former smoker from [facility] with a history of HTN, CAD (s/p 1 MARY to circumflex 7/2021, 60% stenosis of mid LAD), a \par \par \par Plan:\par 1.  dyspnea on exertion :   cardiac cath.   transthoracic echocardiogram \par 2. CAD s/p MARY: restart e Aspirin,  Statin, and Toprol 25mg Daily.  routine blood work.  restart  plavix. \par 3  HTN: BP stable. Continue Lisinopril 2.5 mg daily\par 4. HLD:   Continue Atorvastatin   \par Will order and review ECG for the above mentioned diagnosis/condition/symptoms  a \par  \par  \par  [EKG obtained to assist in diagnosis and management of assessed problem(s)] : EKG obtained to assist in diagnosis and management of assessed problem(s)

## 2023-01-20 NOTE — HISTORY OF PRESENT ILLNESS
[FreeTextEntry1] : reason:  hypertension and coronary artery disease\par \par HPI for today: :   states he is feelign very fatigue and tired.  ran out ofmeds for few months. feels dyspnea on exertion .  \par no typical chest pain symptoms. no rest pain.\par  \par \par \par old note: he does have gastric pain. epigastric pain .  he walks 2-3 flights of stairs . he fels weak. but no pain .\par its going on  2 mths .    its there all the time.  no weight loss. no loss of apetite. no nausea or vomiting. \par no dyspnea. no dizziness.  \par when he had stents put in he had different symptoms.  a\par \par \par old note: 66 year old male former smoker from [facility] with a history of HTN, CAD (s/p 1 MARY to circumflex 7/2021, 60% stenosis of mid LAD), and borderline type 2 diabetes who presents to the office for follow up of CAD. Reports feeling well today. He is accompanied by his granddaughter, Taina, who is assisting with Icelandic-English translation today. Last office visit he was advised to establish care with a PCP to manage prediabetes and elevated TSH; he has not found a PCP yet. Denies chest pain, SOB at rest, MONGE, palpitations, lightheadedness, dizziness, fatigue, syncope, near syncope and LE edema. He walks most days of the week for about an hour leisurely in the neighborhood without chest pain or SOB. Denies current smoking, alcohol or illicit drug use. He has not needed to use Nitro since last visit.

## 2023-01-30 DIAGNOSIS — E55.9 VITAMIN D DEFICIENCY, UNSPECIFIED: ICD-10-CM

## 2023-01-30 LAB
25(OH)D3 SERPL-MCNC: 16.8 NG/ML
ALBUMIN SERPL ELPH-MCNC: 4.6 G/DL
ALP BLD-CCNC: 98 U/L
ALT SERPL-CCNC: 19 U/L
ANION GAP SERPL CALC-SCNC: 13 MMOL/L
APO B SERPL-MCNC: 74 MG/DL
APO LP(A) SERPL-MCNC: 26.9 NMOL/L
AST SERPL-CCNC: 20 U/L
BASOPHILS # BLD AUTO: 0.09 K/UL
BASOPHILS NFR BLD AUTO: 1.1 %
BILIRUB SERPL-MCNC: 0.6 MG/DL
BUN SERPL-MCNC: 13 MG/DL
CALCIUM SERPL-MCNC: 9.5 MG/DL
CHLORIDE SERPL-SCNC: 104 MMOL/L
CHOLEST SERPL-MCNC: 139 MG/DL
CO2 SERPL-SCNC: 24 MMOL/L
CREAT SERPL-MCNC: 1.1 MG/DL
EGFR: 73 ML/MIN/1.73M2
EOSINOPHIL # BLD AUTO: 1.11 K/UL
EOSINOPHIL NFR BLD AUTO: 13.2 %
ESTIMATED AVERAGE GLUCOSE: 131 MG/DL
GLUCOSE SERPL-MCNC: 110 MG/DL
HBA1C MFR BLD HPLC: 6.2 %
HCT VFR BLD CALC: 40.7 %
HDLC SERPL-MCNC: 33 MG/DL
HGB BLD-MCNC: 12.2 G/DL
IMM GRANULOCYTES NFR BLD AUTO: 0.4 %
LDLC SERPL CALC-MCNC: 80 MG/DL
LYMPHOCYTES # BLD AUTO: 1.99 K/UL
LYMPHOCYTES NFR BLD AUTO: 23.7 %
MAN DIFF?: NORMAL
MCHC RBC-ENTMCNC: 24.5 PG
MCHC RBC-ENTMCNC: 30 GM/DL
MCV RBC AUTO: 81.7 FL
MONOCYTES # BLD AUTO: 0.58 K/UL
MONOCYTES NFR BLD AUTO: 6.9 %
NEUTROPHILS # BLD AUTO: 4.6 K/UL
NEUTROPHILS NFR BLD AUTO: 54.7 %
NONHDLC SERPL-MCNC: 106 MG/DL
NT-PROBNP SERPL-MCNC: 66 PG/ML
PLATELET # BLD AUTO: 295 K/UL
POTASSIUM SERPL-SCNC: 4.8 MMOL/L
PROT SERPL-MCNC: 7.7 G/DL
RBC # BLD: 4.98 M/UL
RBC # FLD: 14.9 %
SODIUM SERPL-SCNC: 141 MMOL/L
TRIGL SERPL-MCNC: 128 MG/DL
TSH SERPL-ACNC: 9.44 UIU/ML
WBC # FLD AUTO: 8.4 K/UL

## 2023-02-01 ENCOUNTER — NON-APPOINTMENT (OUTPATIENT)
Age: 68
End: 2023-02-01

## 2023-02-10 ENCOUNTER — APPOINTMENT (OUTPATIENT)
Dept: CARDIOLOGY | Facility: CLINIC | Age: 68
End: 2023-02-10
Payer: COMMERCIAL

## 2023-02-10 PROCEDURE — 93306 TTE W/DOPPLER COMPLETE: CPT

## 2023-02-10 PROCEDURE — 99072 ADDL SUPL MATRL&STAF TM PHE: CPT

## 2023-03-09 ENCOUNTER — NON-APPOINTMENT (OUTPATIENT)
Age: 68
End: 2023-03-09

## 2023-03-14 NOTE — H&P PST ADULT - HISTORY OF PRESENT ILLNESS
68y/o male former smoker with history of CAD, NSTEMI, PCI of the mLCX, HTN, HLD, and prediabetes    Symptoms:        Angina (Class):        Ischemic Symptoms:     Heart Failure:        Systolic/Diastolic/Combined:        NYHA Class (within 2 weeks):     Assessment of LVEF:       EF: 60-65%       Assessed by: Echo       Date: 2/10/2023    Prior Cardiac Interventions:       PCI's: 7/29/2021  VENTRICLES: No LV gram was performed; however, a recent echocardiogram demonstrated an EF of 55 %.  CORONARY VESSELS: The coronary circulation is right dominant.  LM:     --  LM: There was a 10 % stenosis.  LAD:     --  Mid LAD: There was a 60 % stenosis.  --  D1: There was a 50 % stenosis.  CX:     --  Mid circumflex: There was a 90 % stenosis. It was treated with a SYNERGY XD 2.75 X 28MM drug-eluting stent.  RCA:     --  Mid RCA: There was a 40 % stenosis.  --  RPDA: There was a 50 % stenosis.         CABG: N/A    Noninvasive Testing:   Stress Test: N/A    Echo: 2/10/2023  Left Ventricle: The left ventricular internal cavity size is normal. Global LV systolic function was normal. Left ventricular ejection fraction, by visual estimation, is 60 to 65%. Spectral Doppler shows normal pattern of LV diastolic filling. Normal wall motion. LV EF by Biplane MOD = 64%.   Right Ventricle: Normal right ventricular size and function. TV S' 0.1 m/s.   Left Atrium: Mildly enlarged left atrium.   Right Atrium: The right atrium is normal in size.   Pericardium: There is no evidence of pericardial effusion.   Mitral Valve: Structurally normal mitral valve, with normal leaflet excursion. No evidence of mitral valve regurgitation is seen.   Tricuspid Valve: Structurally normal tricuspid valve, with normal leaflet excursion. No tricuspid regurgitation is visualized.   Aortic Valve: The aortic valve is trileaflet. Sclerotic aortic valve with normal opening. No evidence of aortic valve regurgitation is seen.   Pulmonic Valve: The pulmonic valve was not well visualized. Trace pulmonic valve regurgitation.   Aorta: The aortic root and ascending aorta are structurally normal, with no evidence of dilitation.   Pulmonary Artery: The pulmonary artery is not well seen.   Venous: A normal flow pattern is recorded from the right upper pulmonary vein. The inferior vena cava was normal sized, with respiratory size variation greater than 50%.   In comparison to the previous echocardiogram(s): No significant changes compared with prior TTE dated 6/15/2021    Antianginal Therapies:        Beta Blockers: N/A       Calcium Channel Blockers: N/A       Long Acting Nitrates: N/A       Ranexa: N/A    Associated Risk Factors:        Frailty: N/A       Cerebrovascular Disease: N/A       Chronic Lung Disease: N/A       Peripheral Arterial Disease: N/A       Chronic Kidney Disease (if yes, what is GFR): N/A       Uncontrolled Diabetes (if yes, what is HgbA1C or FBS): N/A       Poorly Controlled Hypertension (if yes, what is SBP): N/A       Morbid Obesity (if yes, what is BMI): N/A       History of Recent Ventricular Arrhythmia: N/A       Inability to Ambulate Safely: N/A       Need for Therapeutic Anticoagulation: N/A       Antiplatelet or Contrast Allergy: N/A    Social History:        Marital:        Tobacco:        ETOH:        Caffeine:     ROS:   General: No fevers/chills. No fatigue  HEENT: No hearing loss. No visual disturbances. No headaches. No epistaxis.  Pulmonary: No dyspnea. No wheeze. No cough.  CV: No chest pain. No MONGE. No palpitations. No orthopnea. No PND. No edema.  GI: No BRBPR. No melena. No nausea.  : No hematuria.  Neuro: No weakness. No paresthesia. No syncope.    T(C): --  HR: --  BP: --  RR: --  SpO2: --  Physical Exam:   General: Awake, alert, speech clear, no acute distress.  Neck: No bruit, no JVD.  Chest: S1, S2. No murmur. CTA.  Abdomen: Soft. Nondistended.  Extremities: No edema. Pulses: DP: Right: 2+, Left: 2+, Radial: Right: 2+, Left: 2+ 68y/o male former smoker with history of CAD, NSTEMI, PCI of the mLCX and residual moderate LAD 60% (IFR 0.91), HTN, HLD, and prediabetes. He presents with several months of intermittent fatigue with MONGE. Ambulates leisurely in his neighborhood about 1 mile and sometimes has dyspnea/fatigue. Presents for [facility].    Symptoms:        Angina (Class): 2       Ischemic Symptoms: fatigue and dyspnea    Heart Failure:        Systolic/Diastolic/Combined:        NYHA Class (within 2 weeks):     Assessment of LVEF:       EF: 60-65%       Assessed by: Echo       Date: 2/10/2023    Prior Cardiac Interventions:       PCI's: 7/29/2021  VENTRICLES: No LV gram was performed; however, a recent echocardiogram demonstrated an EF of 55 %.  CORONARY VESSELS: The coronary circulation is right dominant.  LM:     --  LM: There was a 10 % stenosis.  LAD:     --  Mid LAD: There was a 60 % stenosis. (IFR 0.91)  --  D1: There was a 50 % stenosis.  CX:     --  Mid circumflex: There was a 90 % stenosis. It was treated with a SYNERGY XD 2.75 X 28MM drug-eluting stent.  RCA:     --  Mid RCA: There was a 40 % stenosis.  --  RPDA: There was a 50 % stenosis.         CABG: N/A    Noninvasive Testing:   Stress Test: N/A    Echo: 2/10/2023  Left Ventricle: The left ventricular internal cavity size is normal. Global LV systolic function was normal. Left ventricular ejection fraction, by visual estimation, is 60 to 65%. Spectral Doppler shows normal pattern of LV diastolic filling. Normal wall motion. LV EF by Biplane MOD = 64%.   Right Ventricle: Normal right ventricular size and function. TV S' 0.1 m/s.   Left Atrium: Mildly enlarged left atrium.   Right Atrium: The right atrium is normal in size.   Pericardium: There is no evidence of pericardial effusion.   Mitral Valve: Structurally normal mitral valve, with normal leaflet excursion. No evidence of mitral valve regurgitation is seen.   Tricuspid Valve: Structurally normal tricuspid valve, with normal leaflet excursion. No tricuspid regurgitation is visualized.   Aortic Valve: The aortic valve is trileaflet. Sclerotic aortic valve with normal opening. No evidence of aortic valve regurgitation is seen.   Pulmonic Valve: The pulmonic valve was not well visualized. Trace pulmonic valve regurgitation.   Aorta: The aortic root and ascending aorta are structurally normal, with no evidence of dilitation.   Pulmonary Artery: The pulmonary artery is not well seen.   Venous: A normal flow pattern is recorded from the right upper pulmonary vein. The inferior vena cava was normal sized, with respiratory size variation greater than 50%.   In comparison to the previous echocardiogram(s): No significant changes compared with prior TTE dated 6/15/2021    Antianginal Therapies:        Beta Blockers: Metoprolol ER 25 mg       Calcium Channel Blockers: N/A       Long Acting Nitrates: N/A       Ranexa: N/A    Associated Risk Factors:        Frailty: N/A       Cerebrovascular Disease: N/A       Chronic Lung Disease: N/A       Peripheral Arterial Disease: N/A       Chronic Kidney Disease (if yes, what is GFR): N/A       Uncontrolled Diabetes (if yes, what is HgbA1C or FBS): N/A       Poorly Controlled Hypertension (if yes, what is SBP): N/A       Morbid Obesity (if yes, what is BMI): N/A       History of Recent Ventricular Arrhythmia: N/A       Inability to Ambulate Safely: N/A       Need for Therapeutic Anticoagulation: N/A       Antiplatelet or Contrast Allergy: N/A    Social History:        Marital:        Tobacco: remote       ETOH:        Caffeine:     ROS:   General: No fevers/chills. No fatigue  HEENT: No hearing loss. No visual disturbances. No headaches. No epistaxis.  Pulmonary: No dyspnea. No wheeze. No cough.  CV: No chest pain. No MONGE. No palpitations. No orthopnea. No PND. No edema.  GI: No BRBPR. No melena. No nausea.  : No hematuria.  Neuro: No weakness. No paresthesia. No syncope.    T(C): --  HR: --  BP: --  RR: --  SpO2: --  Physical Exam:   General: Awake, alert, speech clear, no acute distress.  Neck: No bruit, no JVD.  Chest: S1, S2. No murmur. CTA.  Abdomen: Soft. Nondistended.  Extremities: No edema. Pulses: DP: Right: 2+, Left: 2+, Radial: Right: 2+, Left: 2+

## 2023-03-14 NOTE — H&P PST ADULT - ASSESSMENT
Assessment:     ASA:   Mall:   ABR:   COVID:   GFR:   Creatinine:   10 Year ASCVD Risk:     Problem List:   1.   ·	LHC and possible intervention. Consent obtained.  ·	Procedure explained and questions answered.   ·	Will start DAPT if PCI performed.  ·	IV:  mL IV over 1 hour pre and post procedure  ·	Aspirin if not taken today.    2.     Discharge Planning:   ·	Discharge today if no PCI performed.  ·	Discharge in AM if PCI performed. Assessment: 68 yo male with CAD s/p MARY LCx and residual moderate LAD disease here with few months of exertional MONGE/fatigue. For LHC.    ASA: 3  Mall: 2  ABR: 2.5%  GFR: 76  Creatinine: 1.06      Problem List:   1.   ·	LHC and possible intervention. Consent obtained.  ·	Procedure explained and questions answered.   ·	Pt. on DAPT   ·	IV:  mL IV over 1 hour pre and post procedure  ·	Aspirin if not taken today.    2.     Discharge Planning:   ·	Discharge today if no PCI performed.  ·	Discharge in AM if PCI performed.

## 2023-03-14 NOTE — H&P PST ADULT - OTHER CARE PROVIDERS
Marva Griffith ([facility], [address], [place] [address], Phone: [phone], Fax: [phone])

## 2023-03-14 NOTE — H&P PST ADULT - NSICDXPASTMEDICALHX_GEN_ALL_CORE_FT
PAST MEDICAL HISTORY:  Coronary artery disease involving native coronary artery of native heart with unstable angina pector     Hyperlipidemia, unspecified hyperlipidemia type     NSTEMI (non-ST elevation myocardial infarction) plavix    Prediabetes     Primary hypertension

## 2023-03-15 ENCOUNTER — INPATIENT (INPATIENT)
Facility: HOSPITAL | Age: 68
LOS: 0 days | Discharge: HOME | DRG: 247 | End: 2023-03-16
Attending: INTERNAL MEDICINE | Admitting: INTERNAL MEDICINE
Payer: MEDICAID

## 2023-03-15 ENCOUNTER — TRANSCRIPTION ENCOUNTER (OUTPATIENT)
Age: 68
End: 2023-03-15

## 2023-03-15 VITALS
DIASTOLIC BLOOD PRESSURE: 77 MMHG | TEMPERATURE: 98 F | SYSTOLIC BLOOD PRESSURE: 164 MMHG | OXYGEN SATURATION: 98 % | HEART RATE: 62 BPM | RESPIRATION RATE: 16 BRPM

## 2023-03-15 DIAGNOSIS — R07.9 CHEST PAIN, UNSPECIFIED: ICD-10-CM

## 2023-03-15 DIAGNOSIS — Z95.5 PRESENCE OF CORONARY ANGIOPLASTY IMPLANT AND GRAFT: ICD-10-CM

## 2023-03-15 DIAGNOSIS — I25.10 ATHEROSCLEROTIC HEART DISEASE OF NATIVE CORONARY ARTERY WITHOUT ANGINA PECTORIS: ICD-10-CM

## 2023-03-15 DIAGNOSIS — R06.02 SHORTNESS OF BREATH: ICD-10-CM

## 2023-03-15 DIAGNOSIS — I10 ESSENTIAL (PRIMARY) HYPERTENSION: ICD-10-CM

## 2023-03-15 DIAGNOSIS — Z95.5 PRESENCE OF CORONARY ANGIOPLASTY IMPLANT AND GRAFT: Chronic | ICD-10-CM

## 2023-03-15 LAB
A1C WITH ESTIMATED AVERAGE GLUCOSE RESULT: 6.1 % — HIGH (ref 4–5.6)
ANION GAP SERPL CALC-SCNC: 8 MMOL/L — SIGNIFICANT CHANGE UP (ref 5–17)
BASOPHILS # BLD AUTO: 0.06 K/UL — SIGNIFICANT CHANGE UP (ref 0–0.2)
BASOPHILS NFR BLD AUTO: 0.6 % — SIGNIFICANT CHANGE UP (ref 0–2)
BUN SERPL-MCNC: 13.1 MG/DL — SIGNIFICANT CHANGE UP (ref 8–20)
CALCIUM SERPL-MCNC: 9.2 MG/DL — SIGNIFICANT CHANGE UP (ref 8.4–10.5)
CHLORIDE SERPL-SCNC: 103 MMOL/L — SIGNIFICANT CHANGE UP (ref 96–108)
CHOLEST SERPL-MCNC: 80 MG/DL — SIGNIFICANT CHANGE UP
CO2 SERPL-SCNC: 28 MMOL/L — SIGNIFICANT CHANGE UP (ref 22–29)
CREAT SERPL-MCNC: 1.06 MG/DL — SIGNIFICANT CHANGE UP (ref 0.5–1.3)
EGFR: 76 ML/MIN/1.73M2 — SIGNIFICANT CHANGE UP
EOSINOPHIL # BLD AUTO: 1.36 K/UL — HIGH (ref 0–0.5)
EOSINOPHIL NFR BLD AUTO: 14.6 % — HIGH (ref 0–6)
ESTIMATED AVERAGE GLUCOSE: 128 MG/DL — HIGH (ref 68–114)
GLUCOSE SERPL-MCNC: 104 MG/DL — HIGH (ref 70–99)
HCT VFR BLD CALC: 38.2 % — LOW (ref 39–50)
HDLC SERPL-MCNC: 30 MG/DL — LOW
HGB BLD-MCNC: 11.3 G/DL — LOW (ref 13–17)
IMM GRANULOCYTES NFR BLD AUTO: 0.3 % — SIGNIFICANT CHANGE UP (ref 0–0.9)
LIPID PNL WITH DIRECT LDL SERPL: 37 MG/DL — SIGNIFICANT CHANGE UP
LYMPHOCYTES # BLD AUTO: 2.13 K/UL — SIGNIFICANT CHANGE UP (ref 1–3.3)
LYMPHOCYTES # BLD AUTO: 22.9 % — SIGNIFICANT CHANGE UP (ref 13–44)
MAGNESIUM SERPL-MCNC: 2.2 MG/DL — SIGNIFICANT CHANGE UP (ref 1.6–2.6)
MCHC RBC-ENTMCNC: 23.7 PG — LOW (ref 27–34)
MCHC RBC-ENTMCNC: 29.6 GM/DL — LOW (ref 32–36)
MCV RBC AUTO: 80.1 FL — SIGNIFICANT CHANGE UP (ref 80–100)
MONOCYTES # BLD AUTO: 0.62 K/UL — SIGNIFICANT CHANGE UP (ref 0–0.9)
MONOCYTES NFR BLD AUTO: 6.7 % — SIGNIFICANT CHANGE UP (ref 2–14)
NEUTROPHILS # BLD AUTO: 5.11 K/UL — SIGNIFICANT CHANGE UP (ref 1.8–7.4)
NEUTROPHILS NFR BLD AUTO: 54.9 % — SIGNIFICANT CHANGE UP (ref 43–77)
NON HDL CHOLESTEROL: 50 MG/DL — SIGNIFICANT CHANGE UP
PLATELET # BLD AUTO: 300 K/UL — SIGNIFICANT CHANGE UP (ref 150–400)
POTASSIUM SERPL-MCNC: 4.7 MMOL/L — SIGNIFICANT CHANGE UP (ref 3.5–5.3)
POTASSIUM SERPL-SCNC: 4.7 MMOL/L — SIGNIFICANT CHANGE UP (ref 3.5–5.3)
RBC # BLD: 4.77 M/UL — SIGNIFICANT CHANGE UP (ref 4.2–5.8)
RBC # FLD: 14.8 % — HIGH (ref 10.3–14.5)
SODIUM SERPL-SCNC: 139 MMOL/L — SIGNIFICANT CHANGE UP (ref 135–145)
TRIGL SERPL-MCNC: 64 MG/DL — SIGNIFICANT CHANGE UP
WBC # BLD: 9.31 K/UL — SIGNIFICANT CHANGE UP (ref 3.8–10.5)
WBC # FLD AUTO: 9.31 K/UL — SIGNIFICANT CHANGE UP (ref 3.8–10.5)

## 2023-03-15 PROCEDURE — 93571 IV DOP VEL&/PRESS C FLO 1ST: CPT | Mod: 26,52,LD

## 2023-03-15 PROCEDURE — 93010 ELECTROCARDIOGRAM REPORT: CPT | Mod: 76

## 2023-03-15 PROCEDURE — 92928 PRQ TCAT PLMT NTRAC ST 1 LES: CPT | Mod: LD

## 2023-03-15 PROCEDURE — 92978 ENDOLUMINL IVUS OCT C 1ST: CPT | Mod: 26,LD

## 2023-03-15 PROCEDURE — 93458 L HRT ARTERY/VENTRICLE ANGIO: CPT | Mod: 26,XU

## 2023-03-15 PROCEDURE — 99152 MOD SED SAME PHYS/QHP 5/>YRS: CPT

## 2023-03-15 RX ORDER — ATORVASTATIN CALCIUM 80 MG/1
40 TABLET, FILM COATED ORAL AT BEDTIME
Refills: 0 | Status: DISCONTINUED | OUTPATIENT
Start: 2023-03-15 | End: 2023-03-16

## 2023-03-15 RX ORDER — EPTIFIBATIDE 2 MG/ML
2.01 INJECTION, SOLUTION INTRAVENOUS
Qty: 75 | Refills: 0 | Status: DISCONTINUED | OUTPATIENT
Start: 2023-03-15 | End: 2023-03-16

## 2023-03-15 RX ORDER — SODIUM CHLORIDE 9 MG/ML
250 INJECTION INTRAMUSCULAR; INTRAVENOUS; SUBCUTANEOUS ONCE
Refills: 0 | Status: COMPLETED | OUTPATIENT
Start: 2023-03-15 | End: 2023-03-15

## 2023-03-15 RX ORDER — METOPROLOL TARTRATE 50 MG
25 TABLET ORAL DAILY
Refills: 0 | Status: DISCONTINUED | OUTPATIENT
Start: 2023-03-15 | End: 2023-03-16

## 2023-03-15 RX ORDER — CHLORHEXIDINE GLUCONATE 213 G/1000ML
1 SOLUTION TOPICAL ONCE
Refills: 0 | Status: DISCONTINUED | OUTPATIENT
Start: 2023-03-15 | End: 2023-03-16

## 2023-03-15 RX ORDER — ACETAMINOPHEN 500 MG
650 TABLET ORAL EVERY 6 HOURS
Refills: 0 | Status: DISCONTINUED | OUTPATIENT
Start: 2023-03-15 | End: 2023-03-16

## 2023-03-15 RX ORDER — SODIUM CHLORIDE 9 MG/ML
250 INJECTION INTRAMUSCULAR; INTRAVENOUS; SUBCUTANEOUS ONCE
Refills: 0 | Status: DISCONTINUED | OUTPATIENT
Start: 2023-03-15 | End: 2023-03-15

## 2023-03-15 RX ORDER — CLOPIDOGREL BISULFATE 75 MG/1
75 TABLET, FILM COATED ORAL DAILY
Refills: 0 | Status: DISCONTINUED | OUTPATIENT
Start: 2023-03-15 | End: 2023-03-16

## 2023-03-15 RX ORDER — ASPIRIN/CALCIUM CARB/MAGNESIUM 324 MG
81 TABLET ORAL DAILY
Refills: 0 | Status: DISCONTINUED | OUTPATIENT
Start: 2023-03-15 | End: 2023-03-16

## 2023-03-15 RX ADMIN — Medication 650 MILLIGRAM(S): at 20:52

## 2023-03-15 RX ADMIN — SODIUM CHLORIDE 250 MILLILITER(S): 9 INJECTION INTRAMUSCULAR; INTRAVENOUS; SUBCUTANEOUS at 09:57

## 2023-03-15 RX ADMIN — ATORVASTATIN CALCIUM 40 MILLIGRAM(S): 80 TABLET, FILM COATED ORAL at 20:45

## 2023-03-15 RX ADMIN — Medication 81 MILLIGRAM(S): at 10:33

## 2023-03-15 RX ADMIN — SODIUM CHLORIDE 250 MILLILITER(S): 9 INJECTION INTRAMUSCULAR; INTRAVENOUS; SUBCUTANEOUS at 13:30

## 2023-03-15 RX ADMIN — CLOPIDOGREL BISULFATE 75 MILLIGRAM(S): 75 TABLET, FILM COATED ORAL at 10:33

## 2023-03-15 RX ADMIN — Medication 650 MILLIGRAM(S): at 21:50

## 2023-03-15 NOTE — DISCHARGE NOTE PROVIDER - NSDCCPTREATMENT_GEN_ALL_CORE_FT
PRINCIPAL PROCEDURE  Procedure: Left heart cardiac cath  Findings and Treatment: Go to the ED with any acute onset of chest pain, palpitations, shortness of breath or dizziness.   Managing risk factors will help prevent future blockages, risk factors may include: high blood pressure, high cholesterol, obesity, sedentary life style and smoking.    Your diet should be low in fat, cholesterol, salt and carbohydrates, increase fruits (caution if diabetic), vegetables and whole grains/fiber rich foods.   Take all your cardiac  medications as prescribed.  Restricted use with no heavy lifting of affected arm for 48 hours.  No submerging the arm in water for 48 hours.  You may start showering today.  Call your doctor for any bleeding, swelling, loss of sensation in the hand or fingers, or fingers turning blue.  If heavy bleeding or large lumps form, hold pressure at the spot and come to the Emergency Room.  access  Exercise is a very important factor in heart health. Once your post procedure restrictions have passed, you should engage in heart healthy, aerobic exercise. Be sure to have clearance from your cardiologist. Cardiac rehab programs could be extremely beneficial and your cardiologist could help set this up.   Follow up with your cardiologist within 1-2 weeks after your procedure.   Call your cardiologist or our unit ([phone]) with any questions or concerns that may arise.

## 2023-03-15 NOTE — DISCHARGE NOTE PROVIDER - CARE PROVIDER_API CALL
Marva Griffith)  Cardiology; Internal Medicine  [address] [place] [address], [address] [id number]  Phone: [phone]  Fax: [phone]  Follow Up Time:

## 2023-03-15 NOTE — PROGRESS NOTE ADULT - SUBJECTIVE AND OBJECTIVE BOX
Now s/p LHC via RRA with Dr. Arroyo tolerated procedure well. Pt arrived to recovery in NAD and HDS, RRA access site stable, no bleed/hematoma, distal pulse +, with band   Intraprocedurally: Plavix 300 mg Po, Versed 1.5 mg, Fentanyl 125 ucg, Heparin 6000 Units, Integrillin bolus 5.6 x 2 and gtt @ 10.2 cc/hr, 500 cc bolus  Findings: I V CAD mLAD, MARY 3 x 34. D1 got jailed with PCI and unsuccessful attempt to re open. Pt. developed CP and EKG changes, Integrillin started, NTG paste on and later ST improved and pt. CP subsided. In recovery patient reports no CP at this time. Integrillin gtt maintained and will continue to monitor.  Post Cath EKG: SR with slight ST changes but improved from prior    Plan:  -Formal cath report pending  -Post procedure management/monitoring per protocol  -Access site precautions  -Radial compression band removal at 1630  -Bedrest with HOB 45   -Labs and EKG in am  -NS 0.9% 250ml/hr x 1 bolus: post procedure SHAILESH ppx   -Repeat ECG if any clinical indication or change on tele  -Continue current medical therapy  -Dual anti platelet therapy with aspirin/plavix   -Cont BB with Toprol 25 mg po daily   -Cont statin therapy with Lipitor 40 mg po qHS **  -Educated regarding strict adherence with DAPT   -Maintain Integrilin gtt x 6 hours  -Educated regarding post procedure management and care  -Discussed the importance of RF modification  -Cardiac rehab info provided/referral and communication to cardiac rehab completed  -F/U outpt in 1-2 weeks with Cardiologist Dr. Griffith  -DISPO: Plan for D/C in am if remains HDS, ECG and labs in am stable and without complications

## 2023-03-15 NOTE — DISCHARGE NOTE PROVIDER - NSDCFUSCHEDAPPT_GEN_ALL_CORE_FT
Caridad Hernandez Physician [facility]  CARDIOLOGY [address]  Scheduled Appointment: 04/14/2023

## 2023-03-15 NOTE — DISCHARGE NOTE PROVIDER - NSDCMRMEDTOKEN_GEN_ALL_CORE_FT
full weight-bearing aspirin 81 mg oral tablet, chewable: 1 tab(s) orally once a day  atorvastatin 40 mg oral tablet: 1 tab(s) orally once a day (at bedtime)  clopidogrel 75 mg oral tablet: 1 tab(s) orally once a day  Metoprolol Succinate ER 25 mg oral tablet, extended release: 1 tab(s) orally once a day

## 2023-03-15 NOTE — DISCHARGE NOTE PROVIDER - NSDCCPCAREPLAN_GEN_ALL_CORE_FT
PRINCIPAL DISCHARGE DIAGNOSIS  Diagnosis: CAD S/P percutaneous coronary angioplasty  Assessment and Plan of Treatment: Coronary artery disease is the buildup of plaque in the arteries that supply oxygen-rich blood to your heart. Plaque causes a narrowing or blockage that could result in a heart attack. Symptoms include chest pain or discomfort, shortness of breath, dizziness, palpitations, fatigue or reduced exercise tolerance. .Go to the ED with any acute onset of chest pain, palpitations, shortness of breath or dizziness. Do NOT miss a dose or stop taking your Aspirin and Plavix these medications keep your stent open and prevent a heart attack. If anyone tells you to stop these medications, speak to your cardiologist immediately.Managing risk factors will help keep your stent open and prevent future blockages, risk factors may include: high blood pressure, high cholesterol, diabetes, obesity, sedentary life style and smoking.  Your diet should be low in fat, cholesterol, salt and carbohydrates, increase fruits (caution if diabetic), vegetables and whole grains/fiber rich foods. Take all your cardiac  medications as prescribed.   Exercise is a very important factor in heart health. Once your post procedure restrictions have passed, you should engage in heart healthy, aerobic exercise. Be sure to have clearance from your cardiologist. Cardiac rehab programs could be extremely beneficial and your cardiologist could help set this up. Follow up with your cardiologist within 1-2 weeks after your procedure. Call your cardiologist or our unit ([phone]) with any questions or concerns that may arise.      SECONDARY DISCHARGE DIAGNOSES  Diagnosis: Hyperlipidemia  Assessment and Plan of Treatment: Your diet should be low in fat, cholesterol, salt and carbohydrates, increase fruits (caution if diabetic), vegetables and whole grains/fiber rich foods. Take your cholesterol lowering medications as prescribed. Routine follow up lipid panels

## 2023-03-15 NOTE — DISCHARGE NOTE PROVIDER - HOSPITAL COURSE
66y/o male former smoker with history of CAD, NSTEMI, PCI of the mLCX and residual moderate LAD 60% (IFR 0.91), HTN, HLD, and prediabetes. He presents with several months of intermittent fatigue with MONGE. Ambulates leisurely in his neighborhood about 1 mile and sometimes has dyspnea/fatigue. Presents for LHC.   s/p LHC via RRA with Dr. Arroyo tolerated procedure well. Pt arrived to recovery in NAD and HDS, RRA access site stable, no bleed/hematoma, distal pulse +, with band   Intraprocedurally: Plavix 300 mg Po, Versed 1.5 mg, Fentanyl 125 ucg, Heparin 6000 Units, Integrilin bolus 5.6 x 2 and gtt @ 10.2 cc/hr, 500 cc bolus  Findings: I V CAD mLAD, MARY 3 x 34. D1 got jailed with PCI and unsuccessful attempt to re open. Pt. developed CP and EKG changes, Integrilin started, NTG paste on and later ST improved and pt. CP subsided. In recovery patient reports no CP at this time. Integrilin gtt maintained and will continue to monitor.  Post Cath EKG: SR with slight ST changes but improved from prior   68y/o male former smoker with history of CAD, NSTEMI, PCI of the mLCX and residual moderate LAD 60% (IFR 0.91), HTN, HLD, and prediabetes. He presents with several months of intermittent fatigue with MONGE. Ambulates leisurely in his neighborhood about 1 mile and sometimes has dyspnea/fatigue. Presents for LHC.   s/p LHC via RRA with Dr. Arroyo tolerated procedure well. Pt arrived to recovery in NAD and HDS, RRA access site stable, no bleed/hematoma, distal pulse +, with band   Intraprocedurally: Plavix 300 mg Po, Versed 1.5 mg, Fentanyl 125 ucg, Heparin 6000 Units, Integrilin bolus 5.6 x 2 and gtt @ 10.2 cc/hr, 500 cc bolus  Findings: I V CAD mLAD, MARY 3 x 34. D1 got jailed with PCI and unsuccessful attempt to re open. Pt. developed CP and EKG changes, Integrilin started, NTG paste on and later ST improved and pt. CP subsided. In recovery patient reports no CP at this time. Integrilin gtt maintained and will continue to monitor.  Post Cath EKG: SR with slight ST changes but improved from prior,    Pt stable overnight   EKG in am without significant ST changes  Reviewed Radial site care with the assist of  service.

## 2023-03-15 NOTE — CHART NOTE - NSCHARTNOTEFT_GEN_A_CORE
Service  pt. c/o chest burning similar to what he was feeling in the room with slight intensity. VSS. EKG done and SR with no acute changes, unchanged from prior post PCI. NTP taken off and 0.5 inch reapplied.  Advised to notify RN for any worsening in symptoms.  Service [facility] [id number]  pt. c/o chest burning similar to what he was feeling in the room with slight intensity. VSS. EKG done and SR with no acute changes, unchanged from prior post PCI. NTP taken off and 0.5 inch reapplied.  Advised to notify RN for any worsening in symptoms.

## 2023-03-16 ENCOUNTER — TRANSCRIPTION ENCOUNTER (OUTPATIENT)
Age: 68
End: 2023-03-16

## 2023-03-16 VITALS
DIASTOLIC BLOOD PRESSURE: 74 MMHG | OXYGEN SATURATION: 97 % | SYSTOLIC BLOOD PRESSURE: 156 MMHG | RESPIRATION RATE: 16 BRPM | HEART RATE: 58 BPM

## 2023-03-16 LAB
ANION GAP SERPL CALC-SCNC: 9 MMOL/L — SIGNIFICANT CHANGE UP (ref 5–17)
BASOPHILS # BLD AUTO: 0.05 K/UL — SIGNIFICANT CHANGE UP (ref 0–0.2)
BASOPHILS NFR BLD AUTO: 0.8 % — SIGNIFICANT CHANGE UP (ref 0–2)
BUN SERPL-MCNC: 10.4 MG/DL — SIGNIFICANT CHANGE UP (ref 8–20)
CALCIUM SERPL-MCNC: 8.4 MG/DL — SIGNIFICANT CHANGE UP (ref 8.4–10.5)
CHLORIDE SERPL-SCNC: 103 MMOL/L — SIGNIFICANT CHANGE UP (ref 96–108)
CO2 SERPL-SCNC: 26 MMOL/L — SIGNIFICANT CHANGE UP (ref 22–29)
CREAT SERPL-MCNC: 0.9 MG/DL — SIGNIFICANT CHANGE UP (ref 0.5–1.3)
EGFR: 93 ML/MIN/1.73M2 — SIGNIFICANT CHANGE UP
EOSINOPHIL # BLD AUTO: 0.82 K/UL — HIGH (ref 0–0.5)
EOSINOPHIL NFR BLD AUTO: 13 % — HIGH (ref 0–6)
GLUCOSE BLDC GLUCOMTR-MCNC: 124 MG/DL — HIGH (ref 70–99)
GLUCOSE SERPL-MCNC: 115 MG/DL — HIGH (ref 70–99)
HCT VFR BLD CALC: 32.2 % — LOW (ref 39–50)
HGB BLD-MCNC: 9.9 G/DL — LOW (ref 13–17)
IMM GRANULOCYTES NFR BLD AUTO: 0.2 % — SIGNIFICANT CHANGE UP (ref 0–0.9)
LYMPHOCYTES # BLD AUTO: 1.16 K/UL — SIGNIFICANT CHANGE UP (ref 1–3.3)
LYMPHOCYTES # BLD AUTO: 18.4 % — SIGNIFICANT CHANGE UP (ref 13–44)
MCHC RBC-ENTMCNC: 24 PG — LOW (ref 27–34)
MCHC RBC-ENTMCNC: 30.7 GM/DL — LOW (ref 32–36)
MCV RBC AUTO: 78 FL — LOW (ref 80–100)
MONOCYTES # BLD AUTO: 0.61 K/UL — SIGNIFICANT CHANGE UP (ref 0–0.9)
MONOCYTES NFR BLD AUTO: 9.7 % — SIGNIFICANT CHANGE UP (ref 2–14)
NEUTROPHILS # BLD AUTO: 3.67 K/UL — SIGNIFICANT CHANGE UP (ref 1.8–7.4)
NEUTROPHILS NFR BLD AUTO: 57.9 % — SIGNIFICANT CHANGE UP (ref 43–77)
PLATELET # BLD AUTO: 246 K/UL — SIGNIFICANT CHANGE UP (ref 150–400)
POTASSIUM SERPL-MCNC: 3.8 MMOL/L — SIGNIFICANT CHANGE UP (ref 3.5–5.3)
POTASSIUM SERPL-SCNC: 3.8 MMOL/L — SIGNIFICANT CHANGE UP (ref 3.5–5.3)
RBC # BLD: 4.13 M/UL — LOW (ref 4.2–5.8)
RBC # FLD: 15 % — HIGH (ref 10.3–14.5)
SODIUM SERPL-SCNC: 138 MMOL/L — SIGNIFICANT CHANGE UP (ref 135–145)
WBC # BLD: 6.32 K/UL — SIGNIFICANT CHANGE UP (ref 3.8–10.5)
WBC # FLD AUTO: 6.32 K/UL — SIGNIFICANT CHANGE UP (ref 3.8–10.5)

## 2023-03-16 PROCEDURE — 86900 BLOOD TYPING SEROLOGIC ABO: CPT

## 2023-03-16 PROCEDURE — 80061 LIPID PANEL: CPT

## 2023-03-16 PROCEDURE — 82962 GLUCOSE BLOOD TEST: CPT

## 2023-03-16 PROCEDURE — C1887: CPT

## 2023-03-16 PROCEDURE — C1894: CPT

## 2023-03-16 PROCEDURE — 93005 ELECTROCARDIOGRAM TRACING: CPT

## 2023-03-16 PROCEDURE — 36415 COLL VENOUS BLD VENIPUNCTURE: CPT

## 2023-03-16 PROCEDURE — 83735 ASSAY OF MAGNESIUM: CPT

## 2023-03-16 PROCEDURE — 93458 L HRT ARTERY/VENTRICLE ANGIO: CPT | Mod: XU

## 2023-03-16 PROCEDURE — C1769: CPT

## 2023-03-16 PROCEDURE — 83036 HEMOGLOBIN GLYCOSYLATED A1C: CPT

## 2023-03-16 PROCEDURE — 92978 ENDOLUMINL IVUS OCT C 1ST: CPT | Mod: LD

## 2023-03-16 PROCEDURE — C1874: CPT

## 2023-03-16 PROCEDURE — 85025 COMPLETE CBC W/AUTO DIFF WBC: CPT

## 2023-03-16 PROCEDURE — 86850 RBC ANTIBODY SCREEN: CPT

## 2023-03-16 PROCEDURE — 80048 BASIC METABOLIC PNL TOTAL CA: CPT

## 2023-03-16 PROCEDURE — C1753: CPT

## 2023-03-16 PROCEDURE — 93010 ELECTROCARDIOGRAM REPORT: CPT

## 2023-03-16 PROCEDURE — 86901 BLOOD TYPING SEROLOGIC RH(D): CPT

## 2023-03-16 PROCEDURE — C1725: CPT

## 2023-03-16 PROCEDURE — C9600: CPT | Mod: LD

## 2023-03-16 RX ORDER — METOPROLOL TARTRATE 50 MG
1 TABLET ORAL
Qty: 90 | Refills: 3
Start: 2023-03-16 | End: 2024-03-09

## 2023-03-16 RX ORDER — CLOPIDOGREL BISULFATE 75 MG/1
1 TABLET, FILM COATED ORAL
Qty: 90 | Refills: 3
Start: 2023-03-16 | End: 2024-03-09

## 2023-03-16 RX ORDER — ATORVASTATIN CALCIUM 80 MG/1
1 TABLET, FILM COATED ORAL
Qty: 90 | Refills: 3
Start: 2023-03-16 | End: 2024-03-09

## 2023-03-16 RX ORDER — ASPIRIN/CALCIUM CARB/MAGNESIUM 324 MG
1 TABLET ORAL
Qty: 90 | Refills: 3
Start: 2023-03-16 | End: 2024-03-09

## 2023-03-16 RX ORDER — ASPIRIN/CALCIUM CARB/MAGNESIUM 324 MG
1 TABLET ORAL
Qty: 0 | Refills: 0 | DISCHARGE

## 2023-03-16 RX ADMIN — Medication 25 MILLIGRAM(S): at 05:31

## 2023-03-16 RX ADMIN — Medication 81 MILLIGRAM(S): at 09:03

## 2023-03-16 RX ADMIN — CLOPIDOGREL BISULFATE 75 MILLIGRAM(S): 75 TABLET, FILM COATED ORAL at 09:03

## 2023-03-16 NOTE — DISCHARGE NOTE NURSING/CASE MANAGEMENT/SOCIAL WORK - NSDCPEFALRISK_GEN_ALL_CORE
For information on Fall & Injury Prevention, visit: [email or web address] OR  [email or web address] OR  https://www.cdc.gov/steadi/patient.html

## 2023-03-16 NOTE — DISCHARGE NOTE NURSING/CASE MANAGEMENT/SOCIAL WORK - PATIENT PORTAL LINK FT
You can access the FollowMyHealth Patient Portal offered by [facility] by registering at the following website: [email or web address]. By joining [facility]’s FollowMyHealth portal, you will also be able to view your health information using other applications (apps) compatible with our system.

## 2023-03-16 NOTE — PROGRESS NOTE ADULT - SUBJECTIVE AND OBJECTIVE BOX
Nurse Practitioner Progress note:   s/p LHC via RRA with Dr. Arroyo tolerated procedure well. Pt arrived to recovery in NAD and HDS, RRA access site stable, no bleed/hematoma, distal pulse +, with band   Intraprocedurally: Plavix 300 mg Po, Versed 1.5 mg, Fentanyl 125 ucg, Heparin 6000 Units, Integrillin bolus 5.6 x 2 and gtt @ 10.2 cc/hr, 500 cc bolus  Findings: I V CAD mLAD, MARY 3 x 34. D1 got jailed with PCI and unsuccessful attempt to re open. Pt. developed CP and EKG changes, Integrillin started, NTG paste on and later ST improved and pt. CP subsided. In recovery patient reports no CP at this time. Integrillin gtt maintained and will continue to monitor.  Post Cath EKG: SR with slight ST changes but improved from prior        Patient feels well.  Denies chest pain, shortness of breath, dizziness or palpitations at this time    Pt A&O x 3  Lungs CTA  S1S2 no MRG  Right radial procedure site CDI, no bleeding, no hematoma, able to move all digits with capillary refill < 3 seconds, fingers warm      T(C): 36.5 (03-16-23 @ 05:13), Max: 36.7 (03-15-23 @ 09:45)  HR: 58 (03-16-23 @ 07:15) (58 - 85)  BP: 156/74 (03-16-23 @ 07:15) (117/70 - 164/77)  RR: 16 (03-16-23 @ 07:15) (15 - 18)  SpO2: 97% (03-16-23 @ 07:15) (95% - 98%)    CBC Full  -  ( 16 Mar 2023 04:29 )  WBC Count : 6.32 K/uL  RBC Count : 4.13 M/uL  Hemoglobin : 9.9 g/dL  Hematocrit : 32.2 %  Platelet Count - Automated : 246 K/uL  Mean Cell Volume : 78.0 fl  Mean Cell Hemoglobin : 24.0 pg  Mean Cell Hemoglobin Concentration : 30.7 gm/dL  Auto Neutrophil # : 3.67 K/uL  Auto Lymphocyte # : 1.16 K/uL  Auto Monocyte # : 0.61 K/uL  Auto Eosinophil # : 0.82 K/uL  Auto Basophil # : 0.05 K/uL  Auto Neutrophil % : 57.9 %  Auto Lymphocyte % : 18.4 %  Auto Monocyte % : 9.7 %  Auto Eosinophil % : 13.0 %  Auto Basophil % : 0.8 %    03-16    138  |  103  |  10.4  ----------------------------<  115<H>  3.8   |  26.0  |  0.90    Ca    8.4      16 Mar 2023 04:29  Mg     2.2     03-15        MEDICATIONS  (STANDING):  aspirin  chewable 81 milliGRAM(s) Oral daily  atorvastatin 40 milliGRAM(s) Oral at bedtime  chlorhexidine 4% Liquid 1 Application(s) Topical once  clopidogrel Tablet 75 milliGRAM(s) Oral daily  eptifibatide Infusion 75 mg/100 mL 2.008 MICROgram(s)/kG/Min (10.2 mL/Hr) IV Continuous <Continuous>  metoprolol succinate ER 25 milliGRAM(s) Oral daily    MEDICATIONS  (PRN):  acetaminophen     Tablet .. 650 milliGRAM(s) Oral every 6 hours PRN Mild Pain (1 - 3)    EKG Sinus Bradycardia with nonspecific T wave abnormalities    HPI:  68y/o male former smoker with history of CAD, NSTEMI, PCI of the mLCX and residual moderate LAD 60% (IFR 0.91), HTN, HLD, and prediabetes. He presents with several months of intermittent fatigue with MONGE. Ambulates leisurely in his neighborhood about 1 mile and sometimes has dyspnea/fatigue. Presents for [facility].    Now sp successful PCI and MARY to mLAD    ASSESSMENT/PLAN:    Coronary artery disease  -Admitted overnight for observation  -VS, labs, diet, activity reviewed  -IV hydration  -Encourage PO fluids  -Aspirin mg PO daily  -Plavix 75mg PO daily  -Metoprolol 25  mg PO daily  -atorvastatin 40mg PO QHS   -Plan of care discussed with patient, family and MD  -Cardiac rehab info provided/referral and communication to cardiac rehab completed  -Follow-up with attending MD   within 1 week  -Discussed therapeutic lifestyle changes to reduce risk factors such as following a cardiac diet, weight loss, maintaining a healthy weight, exercise, smoking cessation, medication compliance, and regular follow-up  with MD to know your numbers (BP, cholesterol, weight, and glucose)

## 2023-03-17 PROBLEM — E78.5 HYPERLIPIDEMIA, UNSPECIFIED: Chronic | Status: ACTIVE | Noted: 2023-03-14

## 2023-03-17 PROBLEM — I10 ESSENTIAL (PRIMARY) HYPERTENSION: Chronic | Status: ACTIVE | Noted: 2023-03-14

## 2023-03-17 PROBLEM — I25.110 ATHEROSCLEROTIC HEART DISEASE OF NATIVE CORONARY ARTERY WITH UNSTABLE ANGINA PECTORIS: Chronic | Status: ACTIVE | Noted: 2023-03-14

## 2023-03-17 PROBLEM — R73.03 PREDIABETES: Chronic | Status: ACTIVE | Noted: 2023-03-14

## 2023-03-24 ENCOUNTER — NON-APPOINTMENT (OUTPATIENT)
Age: 68
End: 2023-03-24

## 2023-03-24 ENCOUNTER — APPOINTMENT (OUTPATIENT)
Dept: CARDIOLOGY | Facility: CLINIC | Age: 68
End: 2023-03-24
Payer: COMMERCIAL

## 2023-03-24 VITALS
HEIGHT: 65 IN | SYSTOLIC BLOOD PRESSURE: 142 MMHG | DIASTOLIC BLOOD PRESSURE: 76 MMHG | WEIGHT: 172 LBS | OXYGEN SATURATION: 97 % | BODY MASS INDEX: 28.66 KG/M2 | TEMPERATURE: 97.9 F | HEART RATE: 84 BPM

## 2023-03-24 PROCEDURE — 99215 OFFICE O/P EST HI 40 MIN: CPT

## 2023-03-24 PROCEDURE — 93000 ELECTROCARDIOGRAM COMPLETE: CPT

## 2023-03-24 PROCEDURE — 99072 ADDL SUPL MATRL&STAF TM PHE: CPT

## 2023-03-24 RX ORDER — CHOLECALCIFEROL (VITAMIN D3) 1250 MCG
1.25 MG CAPSULE ORAL
Qty: 15 | Refills: 0 | Status: DISCONTINUED | COMMUNITY
Start: 2023-01-30 | End: 2023-03-24

## 2023-03-24 RX ORDER — NITROGLYCERIN 0.4 MG/1
0.4 TABLET SUBLINGUAL
Qty: 1 | Refills: 2 | Status: DISCONTINUED | COMMUNITY
Start: 2020-07-17 | End: 2023-03-24

## 2023-03-24 RX ORDER — LISINOPRIL 2.5 MG/1
2.5 TABLET ORAL DAILY
Qty: 90 | Refills: 1 | Status: DISCONTINUED | COMMUNITY
End: 2023-03-24

## 2023-04-14 ENCOUNTER — APPOINTMENT (OUTPATIENT)
Dept: CARDIOLOGY | Facility: CLINIC | Age: 68
End: 2023-04-14

## 2023-11-11 NOTE — DISCHARGE NOTE NURSING/CASE MANAGEMENT/SOCIAL WORK - NSDCPETBCESMAN_GEN_ALL_CORE
96 If you are a smoker, it is important for your health to stop smoking. Please be aware that second hand smoke is also harmful.

## 2024-01-24 NOTE — ED PROVIDER NOTE - CHIEF COMPLAINT
Comprehensive Nutrition Assessment    Type and Reason for Visit:  Initial, RD Nutrition Re-Screen/LOS    Nutrition Recommendations/Plan:   Continue diet as tolerated and John  Please document % meals and supplements consumed in flowsheet I/O's under intake      Malnutrition Assessment:  Malnutrition Status:  Insufficient data (01/24/24 1530)        Nutrition Assessment:  Pt admitted with NSTEMI.  PMH: CAD, DM, CKD stage 2.  Chart reviewed for LOS, case discussed during CCU rounds.  Pt sleeping soundly at time of attempted visit.  He is s/p CABG x 4.  No family in the room to speak with.  Appetite of meal trays has been poor per flowsheet's.  He is consuming John but unfortunately this does not have a substantial amount of kcals.  Kitchen is out of stock for diabetic supplements so cannot add any caloric ONS currently.  Will monitor and add ONS as able.     Patient Vitals for the past 120 hrs:   PO Meals Eaten (%)   01/24/24 1245 1 - 25%   01/24/24 1100 1 - 25%   01/23/24 1600 26 - 50%   01/19/24 1730 26 - 50%     Patient Vitals for the past 120 hrs:   PO Supplement (%)   01/23/24 2103 76 - 100%            Nutrition Related Findings:    Meds: pepcid, lantus, humalog, magox, glycolax, senokot, LR@50mL/h.    Edema: +1 trace-generalized, trace-BLE, +1-LUE, +2 pitting-RUE.    BM: 1/22   Wound Type: Surgical Incision, Deep Tissue Injury (L thigh), Partial Thickness  (sacrum)     Current Nutrition Intake & Therapies:    Average Meal Intake: 1-25%, 26-50%  Average Supplements Intake: %  ADULT ORAL NUTRITION SUPPLEMENT; Lunch, Dinner; Wound Healing Oral Supplement  ADULT DIET; Regular; 4 carb choices (60 gm/meal)    Anthropometric Measures:  Height: 182.9 cm (6')  Ideal Body Weight (IBW): 178 lbs (81 kg)       Current Body Weight: 88.5 kg (195 lb 1.7 oz), 109.6 % IBW. Weight Source: Standing Scale  Current BMI (kg/m2): 26.5                          BMI Categories: Overweight (BMI 25.0-29.9)    Estimated Daily  The patient is a 65y Male complaining of chest pain.

## 2024-02-27 ENCOUNTER — NON-APPOINTMENT (OUTPATIENT)
Age: 69
End: 2024-02-27

## 2024-03-03 ENCOUNTER — EMERGENCY (EMERGENCY)
Facility: HOSPITAL | Age: 69
LOS: 1 days | Discharge: HOME | End: 2024-03-03
Attending: EMERGENCY MEDICINE
Payer: MEDICAID

## 2024-03-03 VITALS
SYSTOLIC BLOOD PRESSURE: 142 MMHG | WEIGHT: 162.7 LBS | OXYGEN SATURATION: 97 % | TEMPERATURE: 98 F | HEART RATE: 86 BPM | RESPIRATION RATE: 18 BRPM | HEIGHT: 66 IN | DIASTOLIC BLOOD PRESSURE: 70 MMHG

## 2024-03-03 DIAGNOSIS — Z95.5 PRESENCE OF CORONARY ANGIOPLASTY IMPLANT AND GRAFT: Chronic | ICD-10-CM

## 2024-03-03 PROCEDURE — 99284 EMERGENCY DEPT VISIT MOD MDM: CPT

## 2024-03-03 PROCEDURE — 71046 X-RAY EXAM CHEST 2 VIEWS: CPT | Mod: 26

## 2024-03-03 PROCEDURE — 71046 X-RAY EXAM CHEST 2 VIEWS: CPT

## 2024-03-03 PROCEDURE — 99283 EMERGENCY DEPT VISIT LOW MDM: CPT | Mod: 25

## 2024-03-03 RX ORDER — DIPHENHYDRAMINE HCL 50 MG
50 CAPSULE ORAL ONCE
Refills: 0 | Status: COMPLETED | OUTPATIENT
Start: 2024-03-03 | End: 2024-03-03

## 2024-03-03 RX ADMIN — Medication 50 MILLIGRAM(S): at 15:17

## 2024-03-03 NOTE — ED ADULT NURSE NOTE - OBJECTIVE STATEMENT
Pt speaks Bengadi but wants son to interpret. Per son, pt has been have cold like sx and was given meds: doxycycline, benzonatate, and chest congest RLF. After taking meds, pt experienced rash on right leg and hand and tingling of lips. Pt denies difficulty swallowing or resp distress. AO4, NAD.

## 2024-03-03 NOTE — ED ADULT TRIAGE NOTE - NS ED TRIAGE AVPU SCALE
18
Alert-The patient is alert, awake and responds to voice. The patient is oriented to time, place, and person. The triage nurse is able to obtain subjective information.

## 2024-03-03 NOTE — ED PROVIDER NOTE - PATIENT PORTAL LINK FT
You can access the FollowMyHealth Patient Portal offered by [facility] by registering at the following website: [email or web address]. By joining [facility]’s FollowMyHealth portal, you will also be able to view your health information using other applications (apps) compatible with our system.

## 2024-03-03 NOTE — ED PROVIDER NOTE - CLINICAL SUMMARY MEDICAL DECISION MAKING FREE TEXT BOX
presentation consistent with mild drug allergic reaction, which should be suitable to treatment with Benadryl.  No oral or airway involvement.  Lungs are clear on exam.  Will obtain a  chest x-ray to determine if patient needs a different antibiotic for the cough, which was his presenting symptom that resulted in him receiving the aforementioned antibiotic presentation consistent with mild drug allergic reaction, which should be suitable to treatment with Benadryl.  No oral or airway involvement.  Lungs are clear on exam.  CXR with no infiltrate and the pt appears to have no cough here, advised to stop the doxy, rash is improved, stable for dc

## 2024-03-03 NOTE — ED ADULT NURSE NOTE - CHIEF COMPLAINT QUOTE
pt c/o  allergic reaction, was seen at urgent care yesterday given meds, doxycycline, benzonatate & chest congest RLF  states he had a rash after taking the meds, states "he had water leaking from his body"  A&Ox3, resp wnl

## 2024-03-03 NOTE — ED PROVIDER NOTE - OBJECTIVE STATEMENT
68-year-old male with past med history of hypertension, hyperlipidemia, diabetes presents with allergic reaction.  The patient had several days of a wet cough, went to urgent care  2 days ago, no x-ray performed but was started on doxycycline.  patient reports that yesterday began to have a rash that is very itchy across his lower abdomen and right leg.  He denies any throat tightness, difficulty swallowing, shortness of breath.

## 2024-03-03 NOTE — ED ADULT TRIAGE NOTE - CHIEF COMPLAINT QUOTE
/56   Pulse 75   Temp 97.6  F (36.4  C) (Oral)   Resp 16   Ht 1.524 m (5')   Wt 89.4 kg (197 lb 1.5 oz)   SpO2 97%   BMI 38.49 kg/m    Neuro: Ox. Lethargic. Arouses to voice. Language logical, speech soft and slurred.  Cardiac: Afebrile, VSS.   Respiratory: RA. Brief periods of apnea noted in sleep.  GI/: mattehws patent. UOP borderline. Incontinent of large watery green stool x4. Abd distended; irregular contour.  Diet/appetite: Tolerating clear diet. Denies nausea   Activity: repositioned q2hrs  Pain: oxycodone admin x1  Skin: joi area reddened. Skin intact. Barrier cream applied with cares. Multiple areas of bruising noted.  Lines: 22g in R foot obtained  Drains: matthews catheter  Replacement: po sodium bicarb admin x1  Labs: MD notified of following: platelet- 30; creatinine 346 and VBG results- pH- 7.25; Co2- 34; pO2-72; HCo3-15    Rested btwn cares. Bed alarm on for safety. Not using call light. Frequent rounding performed. Able to make needs known.     pt c/o  allergic reaction, was seen at urgent care yesterday given meds, doxycycline, benzonatate & chest congest RLF  states he had a rash after taking the meds, states "he had water leaking from his body"  A&Ox3, resp wnl

## 2024-03-26 ENCOUNTER — APPOINTMENT (OUTPATIENT)
Dept: CARDIOLOGY | Facility: CLINIC | Age: 69
End: 2024-03-26
Payer: MEDICAID

## 2024-03-26 ENCOUNTER — NON-APPOINTMENT (OUTPATIENT)
Age: 69
End: 2024-03-26

## 2024-03-26 VITALS
OXYGEN SATURATION: 98 % | TEMPERATURE: 97.1 F | DIASTOLIC BLOOD PRESSURE: 70 MMHG | HEIGHT: 65 IN | SYSTOLIC BLOOD PRESSURE: 130 MMHG | HEART RATE: 71 BPM | WEIGHT: 160 LBS | BODY MASS INDEX: 26.66 KG/M2

## 2024-03-26 DIAGNOSIS — R51.9 HEADACHE, UNSPECIFIED: ICD-10-CM

## 2024-03-26 DIAGNOSIS — R06.02 SHORTNESS OF BREATH: ICD-10-CM

## 2024-03-26 DIAGNOSIS — I21.4 NON-ST ELEVATION (NSTEMI) MYOCARDIAL INFARCTION: ICD-10-CM

## 2024-03-26 PROCEDURE — G2211 COMPLEX E/M VISIT ADD ON: CPT | Mod: NC,1L

## 2024-03-26 PROCEDURE — 99215 OFFICE O/P EST HI 40 MIN: CPT

## 2024-03-26 PROCEDURE — 93000 ELECTROCARDIOGRAM COMPLETE: CPT

## 2024-03-26 RX ORDER — CHOLECALCIFEROL (VITAMIN D3) 1250 MCG
1.25 MG CAPSULE ORAL
Qty: 15 | Refills: 0 | Status: ACTIVE | COMMUNITY
Start: 2024-03-26 | End: 1900-01-01

## 2024-03-26 RX ORDER — CLOPIDOGREL BISULFATE 75 MG/1
75 TABLET, FILM COATED ORAL
Qty: 90 | Refills: 3 | Status: ACTIVE | COMMUNITY
Start: 2023-01-20 | End: 1900-01-01

## 2024-03-26 RX ORDER — LEVOTHYROXINE SODIUM 0.03 MG/1
25 TABLET ORAL DAILY
Qty: 90 | Refills: 1 | Status: ACTIVE | COMMUNITY
Start: 2024-03-26 | End: 1900-01-01

## 2024-03-26 RX ORDER — ATORVASTATIN CALCIUM 40 MG/1
40 TABLET, FILM COATED ORAL
Qty: 90 | Refills: 3 | Status: ACTIVE | COMMUNITY
Start: 2022-05-20 | End: 1900-01-01

## 2024-03-26 RX ORDER — ASPIRIN 81 MG/1
81 TABLET, COATED ORAL
Qty: 90 | Refills: 3 | Status: ACTIVE | COMMUNITY
Start: 2021-07-30 | End: 1900-01-01

## 2024-03-26 RX ORDER — METOPROLOL SUCCINATE 25 MG/1
25 TABLET, EXTENDED RELEASE ORAL
Qty: 90 | Refills: 3 | Status: ACTIVE | COMMUNITY
Start: 2021-07-09 | End: 1900-01-01

## 2024-03-26 NOTE — PHYSICAL EXAM
[Well Nourished] : well nourished [No Acute Distress] : no acute distress [No Carotid Bruit] : no carotid bruit [Normal S1, S2] : normal S1, S2 [No Rub] : no rub [No Gallop] : no gallop [Bradycardia] : bradycardic [Rhythm Regular] : regular [I] : a grade 1 [Clear Lung Fields] : clear lung fields [No Respiratory Distress] : no respiratory distress  [Normal Bowel Sounds] : normal bowel sounds [Normal Gait] : normal gait [No Edema] : no edema [Normal Radial B/L] : normal radial B/L [Normal PT B/L] : normal PT B/L [Moves all extremities] : moves all extremities [Normal Speech] : normal speech [Alert and Oriented] : alert and oriented [Normal memory] : normal memory [de-identified] : Deferred- patient wearing a mask

## 2024-03-26 NOTE — CARDIOLOGY SUMMARY
[de-identified] : 3 26 2024:  1 20 2023: sinus.  ST T changes in anterop alteral.  new from prior  7 5 2022  Sinus  Rhythm  -RSR(V1) -nondiagnostic.   -  Nonspecific T-abnormality.   ABNORMAL   12/22/2021: Sinus  Bradycardia  -RSR(V1) -nondiagnostic.   -  Nonspecific T-abnormality.   08/03/2021 Sinus bradycardia, no acute ST T changes [de-identified] : 06/15/2021 LVEF 65-70%, Grade I DD, Mild MR, normal RV size and function [de-identified] : Cath 7/29/2021= LAD 60%, RCA mid 40% rPDA 50%, 90% stenosis of mid circumflex= 1 MARY to circumflex. [de-identified] : 07/02/2021, , TG 80, HDL 41, LDL 46

## 2024-03-26 NOTE — HISTORY OF PRESENT ILLNESS
[FreeTextEntry1] : reason:  hypertension and coronary artery disease  HPI for today: :   he has abnormal TSH.  he never followed up with his PCP. adn not takign syndtrhooid.  he ran out of his meds too. no chest pain . no dyspnea on exertion    old note:   states he is feelign very fatigue and tired.  ran out ofmeds for few months. feels dyspnea on exertion .   no typical chest pain symptoms. no rest pain.     old note: he does have gastric pain. epigastric pain .  he walks 2-3 flights of stairs . he fels weak. but no pain . its going on  2 mths .    its there all the time.  no weight loss. no loss of apetite. no nausea or vomiting.  no dyspnea. no dizziness.   when he had stents put in he had different symptoms.  a   old note: 66 year old male former smoker from [facility] with a history of HTN, CAD (s/p 1 MARY to circumflex 7/2021, 60% stenosis of mid LAD), and borderline type 2 diabetes who presents to the office for follow up of CAD. Reports feeling well today. He is accompanied by his granddaughter, Taina, who is assisting with Kiswahili-English translation today. Last office visit he was advised to establish care with a PCP to manage prediabetes and elevated TSH; he has not found a PCP yet. Denies chest pain, SOB at rest, MONGE, palpitations, lightheadedness, dizziness, fatigue, syncope, near syncope and LE edema. He walks most days of the week for about an hour leisurely in the neighborhood without chest pain or SOB. Denies current smoking, alcohol or illicit drug use. He has not needed to use Nitro since last visit.

## 2024-03-26 NOTE — DISCUSSION/SUMMARY
[Patient] : the patient [Risks] : risks [Benefits] : benefits [Alternatives] : alternatives [With Me] : with me [___ Month(s)] : in [unfilled] month(s) [FreeTextEntry1] : 67  year old male former smoker from [facility] with a history of HTN, CAD (s/p 1 MARY to circumflex 7/2021, 60% stenosis of mid LAD), a    Plan: 1.  dyspnea on exertion :  resolved.  2. CAD s/p MARY:  Aspirin,  Statin, and Toprol 25mg Daily.  routine blood work.  r  3  HTN: BP stable.   4. HLD:   Continue Atorvastatin    Will order and review ECG for the above mentioned diagnosis/condition/symptoms  a       [EKG obtained to assist in diagnosis and management of assessed problem(s)] : EKG obtained to assist in diagnosis and management of assessed problem(s)

## 2024-03-29 ENCOUNTER — LABORATORY RESULT (OUTPATIENT)
Age: 69
End: 2024-03-29

## 2024-04-12 ENCOUNTER — APPOINTMENT (OUTPATIENT)
Dept: NEUROLOGY | Facility: CLINIC | Age: 69
End: 2024-04-12
Payer: MEDICAID

## 2024-04-12 VITALS
SYSTOLIC BLOOD PRESSURE: 150 MMHG | WEIGHT: 140 LBS | HEART RATE: 60 BPM | HEIGHT: 65 IN | BODY MASS INDEX: 23.32 KG/M2 | DIASTOLIC BLOOD PRESSURE: 69 MMHG | OXYGEN SATURATION: 97 %

## 2024-04-12 DIAGNOSIS — G30.9 ALZHEIMER'S DISEASE, UNSPECIFIED: ICD-10-CM

## 2024-04-12 DIAGNOSIS — F02.80 ALZHEIMER'S DISEASE, UNSPECIFIED: ICD-10-CM

## 2024-04-12 PROCEDURE — 99204 OFFICE O/P NEW MOD 45 MIN: CPT

## 2024-04-12 PROCEDURE — T1013A: CUSTOM

## 2024-04-12 RX ORDER — MAGNESIUM 200 MG
1000 TABLET ORAL DAILY
Qty: 30 | Refills: 2 | Status: ACTIVE | COMMUNITY
Start: 2024-04-12 | End: 1900-01-01

## 2024-04-12 NOTE — HISTORY OF PRESENT ILLNESS
[FreeTextEntry1] :  [facility] NEUROLOGY AT [place]  CC: Memory loss HPI: 70 y/o M with hx of HTN, CAD, GERD, HLD, Hypothyroidism, Prediabetes, former smoker who presents for evaluation of memory loss.  Son provides hx.   Son notices memory issues for at least 4-5 years, probably longer even before he moved to the  5 years ago.  Mostly noted are short-term memory issues.  Forgets to take his medications.  Does not remember the day.  Also needs reminders for lunch and dinner.  He loses track of the day.  He is able to bathe himself and feed himself, but he requires assistance in other activities outside of the house.  Watches TV most of the day.  No behavioral issues or personality changes.  Remembers family members.  Has not gotten lost because always is supervised.    Has a prior hx of MARY stent, currently on ASA, Statin.  No known hx of strokes

## 2024-04-12 NOTE — ASSESSMENT
[FreeTextEntry1] : 68 y/o M with hx of HTN, CAD, GERD, HLD, Hypothyroidism, Prediabetes, former smoker who presents for evaluation of memory loss.  Patient unfortunately could not complete the MOCA test despite using an interpretor.  Even on regular conversations, he required assistance by son. I suspect he has Dementia and this has propagated by moving here, with less socialization and also him not being mentally active throughout the day.    Primary Stroke Prevention - Cont. ASA for CAD hx - On Lipitor 40mg daily, LDL 21 - A1c 6.1 - Goal BP Normotension  Dementia - CT head wo. Unclear if MARY is MRI compatible - Recommend B12 supplementation to maintain > 400 - Discussed ways to keep patient mentally active

## 2024-04-12 NOTE — PHYSICAL EXAM
[FreeTextEntry1] :   General: Cooperative, NAD HEENT: NC/AT, no carotid bruits Lungs: CTAB Chest: RRR, no murmurs Extremities: nontender, no erythema Neurological Examination: MOCA could not be completed despite using Interpretor MS: Alert, oriented to self, knows it is April, knows it is Friday, does not know year.  Appropriately interactive, normal affect. Speech fluent w/o paraphasic errors, could not understand how to follow complex tasks or directions, difficulty with executive functioning/vasospatial, difficulty with naming animals, able to name simple items, poor attention CN: PERLL, EOMI, V1-3 sensation intact, face symmetric, hearing intact, palate elevates symmetrically, tongue midline, SCM equal bilaterally Motor: normal bulk and tone, no tremor, rigidity or bradykinesia.  5/5 all over Sens: Intact to light touch. Reflexes: 2/4 all over, downgoing toes b/l Coord:  No dysmetria, ADRIANNA intact Gait: Antalgic, mild buckling of the left knee

## 2024-04-12 NOTE — REASON FOR VISIT
[Initial Evaluation] : an initial evaluation [Time Spent: ____ minutes] : Total time spent using  services: [unfilled] minutes. The patient's primary language is not English thus required  services. [Interpreters_IDNumber] : [id number] [TWNoteComboBox1] : Natalee

## 2024-04-24 ENCOUNTER — OUTPATIENT (OUTPATIENT)
Dept: OUTPATIENT SERVICES | Facility: HOSPITAL | Age: 69
LOS: 1 days | End: 2024-04-24
Payer: MEDICAID

## 2024-04-24 ENCOUNTER — APPOINTMENT (OUTPATIENT)
Dept: CT IMAGING | Facility: CLINIC | Age: 69
End: 2024-04-24
Payer: MEDICAID

## 2024-04-24 DIAGNOSIS — Z95.5 PRESENCE OF CORONARY ANGIOPLASTY IMPLANT AND GRAFT: Chronic | ICD-10-CM

## 2024-04-24 DIAGNOSIS — G30.9 ALZHEIMER'S DISEASE, UNSPECIFIED: ICD-10-CM

## 2024-04-24 PROCEDURE — 70450 CT HEAD/BRAIN W/O DYE: CPT

## 2024-04-24 PROCEDURE — 70450 CT HEAD/BRAIN W/O DYE: CPT | Mod: 26

## 2024-06-04 DIAGNOSIS — R06.02 SHORTNESS OF BREATH: ICD-10-CM

## 2024-06-04 DIAGNOSIS — Z00.00 ENCOUNTER FOR GENERAL ADULT MEDICAL EXAMINATION W/OUT ABNORMAL FINDINGS: ICD-10-CM

## 2024-06-04 DIAGNOSIS — E61.1 IRON DEFICIENCY: ICD-10-CM

## 2024-06-04 DIAGNOSIS — R07.9 CHEST PAIN, UNSPECIFIED: ICD-10-CM

## 2024-06-04 DIAGNOSIS — E53.8 DEFICIENCY OF OTHER SPECIFIED B GROUP VITAMINS: ICD-10-CM

## 2024-06-04 DIAGNOSIS — I25.10 ATHEROSCLEROTIC HEART DISEASE OF NATIVE CORONARY ARTERY W/OUT ANGINA PECTORIS: ICD-10-CM

## 2024-06-04 DIAGNOSIS — E78.5 HYPERLIPIDEMIA, UNSPECIFIED: ICD-10-CM

## 2024-06-04 DIAGNOSIS — I10 ESSENTIAL (PRIMARY) HYPERTENSION: ICD-10-CM

## 2024-06-04 DIAGNOSIS — E03.9 HYPOTHYROIDISM, UNSPECIFIED: ICD-10-CM

## 2024-06-04 DIAGNOSIS — Z95.5 PRESENCE OF CORONARY ANGIOPLASTY IMPLANT AND GRAFT: ICD-10-CM

## 2024-06-04 LAB
25(OH)D3 SERPL-MCNC: 23.1 NG/ML
ANION GAP SERPL CALC-SCNC: 12 MMOL/L
BASOPHILS # BLD AUTO: 0.07 K/UL
BASOPHILS NFR BLD AUTO: 0.9 %
BUN SERPL-MCNC: 11 MG/DL
CALCIUM SERPL-MCNC: 9.3 MG/DL
CHLORIDE SERPL-SCNC: 101 MMOL/L
CHOLEST SERPL-MCNC: 74 MG/DL
CO2 SERPL-SCNC: 26 MMOL/L
CREAT SERPL-MCNC: 1.01 MG/DL
EGFR: 80 ML/MIN/1.73M2
EOSINOPHIL # BLD AUTO: 1.79 K/UL
EOSINOPHIL NFR BLD AUTO: 22.6 %
ESTIMATED AVERAGE GLUCOSE: 128 MG/DL
FERRITIN SERPL-MCNC: 9 NG/ML
FOLATE SERPL-MCNC: 3 NG/ML
GLUCOSE SERPL-MCNC: 100 MG/DL
HBA1C MFR BLD HPLC: 6.1 %
HCT VFR BLD CALC: 34.9 %
HDLC SERPL-MCNC: 39 MG/DL
HGB BLD-MCNC: 9.9 G/DL
IMM GRANULOCYTES NFR BLD AUTO: 0.1 %
IRON SATN MFR SERPL: 5 %
IRON SERPL-MCNC: 22 UG/DL
LDLC SERPL CALC-MCNC: 21 MG/DL
LYMPHOCYTES # BLD AUTO: 2.03 K/UL
LYMPHOCYTES NFR BLD AUTO: 25.6 %
MAN DIFF?: NORMAL
MCHC RBC-ENTMCNC: 21.1 PG
MCHC RBC-ENTMCNC: 28.4 GM/DL
MCV RBC AUTO: 74.3 FL
MONOCYTES # BLD AUTO: 0.61 K/UL
MONOCYTES NFR BLD AUTO: 7.7 %
NEUTROPHILS # BLD AUTO: 3.42 K/UL
NEUTROPHILS NFR BLD AUTO: 43.1 %
NONHDLC SERPL-MCNC: 35 MG/DL
PLATELET # BLD AUTO: 271 K/UL
POTASSIUM SERPL-SCNC: 4.4 MMOL/L
RBC # BLD: 4.7 M/UL
RBC # FLD: 17.7 %
SODIUM SERPL-SCNC: 139 MMOL/L
TIBC SERPL-MCNC: 401 UG/DL
TRIGL SERPL-MCNC: 55 MG/DL
TSH SERPL-ACNC: 7.05 UIU/ML
UIBC SERPL-MCNC: 379 UG/DL
VIT B12 SERPL-MCNC: 235 PG/ML
WBC # FLD AUTO: 7.93 K/UL

## 2024-06-04 RX ORDER — FOLIC ACID 5 MG
5 CAPSULE ORAL DAILY
Qty: 90 | Refills: 3 | Status: ACTIVE | COMMUNITY
Start: 2024-06-04 | End: 1900-01-01

## 2024-06-04 RX ORDER — FERROUS SULFATE TAB EC 324 MG (65 MG FE EQUIVALENT) 324 (65 FE) MG
324 (65 FE) TABLET DELAYED RESPONSE ORAL
Qty: 180 | Refills: 1 | Status: ACTIVE | COMMUNITY
Start: 2024-06-04 | End: 1900-01-01

## 2024-06-06 ENCOUNTER — NON-APPOINTMENT (OUTPATIENT)
Age: 69
End: 2024-06-06

## 2024-06-07 ENCOUNTER — APPOINTMENT (OUTPATIENT)
Dept: NEUROLOGY | Facility: CLINIC | Age: 69
End: 2024-06-07

## 2024-07-19 ENCOUNTER — APPOINTMENT (OUTPATIENT)
Dept: NEUROLOGY | Facility: CLINIC | Age: 69
End: 2024-07-19

## 2024-07-19 VITALS
SYSTOLIC BLOOD PRESSURE: 135 MMHG | HEIGHT: 65 IN | HEART RATE: 63 BPM | BODY MASS INDEX: 23.32 KG/M2 | DIASTOLIC BLOOD PRESSURE: 76 MMHG | WEIGHT: 140 LBS | OXYGEN SATURATION: 98 %

## 2024-07-19 PROCEDURE — 99214 OFFICE O/P EST MOD 30 MIN: CPT

## 2024-09-23 ENCOUNTER — RX RENEWAL (OUTPATIENT)
Age: 69
End: 2024-09-23

## 2025-03-04 ENCOUNTER — NON-APPOINTMENT (OUTPATIENT)
Age: 70
End: 2025-03-04

## 2025-03-20 ENCOUNTER — RX RENEWAL (OUTPATIENT)
Age: 70
End: 2025-03-20

## 2025-03-27 ENCOUNTER — APPOINTMENT (OUTPATIENT)
Dept: OTOLARYNGOLOGY | Facility: CLINIC | Age: 70
End: 2025-03-27

## 2025-04-25 ENCOUNTER — RX RENEWAL (OUTPATIENT)
Age: 70
End: 2025-04-25

## 2025-05-01 ENCOUNTER — APPOINTMENT (OUTPATIENT)
Dept: CARDIOLOGY | Facility: CLINIC | Age: 70
End: 2025-05-01

## 2025-05-13 ENCOUNTER — RX RENEWAL (OUTPATIENT)
Age: 70
End: 2025-05-13

## 2025-06-16 ENCOUNTER — APPOINTMENT (OUTPATIENT)
Dept: CARDIOLOGY | Facility: CLINIC | Age: 70
End: 2025-06-16

## 2025-06-20 ENCOUNTER — RX RENEWAL (OUTPATIENT)
Age: 70
End: 2025-06-20

## 2025-06-26 ENCOUNTER — APPOINTMENT (OUTPATIENT)
Dept: CARDIOLOGY | Facility: CLINIC | Age: 70
End: 2025-06-26

## 2025-06-26 VITALS
HEIGHT: 65 IN | HEART RATE: 79 BPM | BODY MASS INDEX: 28.99 KG/M2 | DIASTOLIC BLOOD PRESSURE: 60 MMHG | OXYGEN SATURATION: 97 % | WEIGHT: 174 LBS | SYSTOLIC BLOOD PRESSURE: 140 MMHG

## 2025-06-26 PROCEDURE — 99215 OFFICE O/P EST HI 40 MIN: CPT

## 2025-06-26 PROCEDURE — G2211 COMPLEX E/M VISIT ADD ON: CPT | Mod: NC

## 2025-06-26 RX ORDER — AMLODIPINE BESYLATE 2.5 MG/1
2.5 TABLET ORAL
Qty: 90 | Refills: 3 | Status: ACTIVE | COMMUNITY
Start: 2025-06-26 | End: 1900-01-01

## 2025-06-27 ENCOUNTER — LABORATORY RESULT (OUTPATIENT)
Age: 70
End: 2025-06-27

## 2025-06-27 LAB
25(OH)D3 SERPL-MCNC: 19.5 NG/ML
ALBUMIN SERPL ELPH-MCNC: 4.3 G/DL
ALP BLD-CCNC: 91 U/L
ALT SERPL-CCNC: 22 U/L
ANION GAP SERPL CALC-SCNC: 13 MMOL/L
AST SERPL-CCNC: 29 U/L
BILIRUB SERPL-MCNC: 0.8 MG/DL
BUN SERPL-MCNC: 14 MG/DL
CALCIUM SERPL-MCNC: 8.7 MG/DL
CHLORIDE SERPL-SCNC: 105 MMOL/L
CHOLEST SERPL-MCNC: 90 MG/DL
CO2 SERPL-SCNC: 21 MMOL/L
CREAT SERPL-MCNC: 1.11 MG/DL
EGFRCR SERPLBLD CKD-EPI 2021: 71 ML/MIN/1.73M2
ESTIMATED AVERAGE GLUCOSE: 120 MG/DL
FERRITIN SERPL-MCNC: 8 NG/ML
FOLATE SERPL-MCNC: 6.4 NG/ML
GLUCOSE SERPL-MCNC: 113 MG/DL
HBA1C MFR BLD HPLC: 5.8 %
HDLC SERPL-MCNC: 28 MG/DL
IRON SATN MFR SERPL: 3 %
IRON SERPL-MCNC: 13 UG/DL
LDLC SERPL-MCNC: 48 MG/DL
NONHDLC SERPL-MCNC: 62 MG/DL
NT-PROBNP SERPL-MCNC: 128 PG/ML
POTASSIUM SERPL-SCNC: 4.8 MMOL/L
PROT SERPL-MCNC: 7 G/DL
SODIUM SERPL-SCNC: 139 MMOL/L
TIBC SERPL-MCNC: 438 UG/DL
TRIGL SERPL-MCNC: 59 MG/DL
TSH SERPL-ACNC: 2.46 UIU/ML
UIBC SERPL-MCNC: 425 UG/DL
VIT B12 SERPL-MCNC: 255 PG/ML

## 2025-06-28 LAB
BASOPHILS # BLD AUTO: 0.06 K/UL
BASOPHILS NFR BLD AUTO: 0.9 %
EOSINOPHIL # BLD AUTO: 1.22 K/UL
EOSINOPHIL NFR BLD AUTO: 17.8 %
HCT VFR BLD CALC: 31.5 %
HGB BLD-MCNC: 8.3 G/DL
IMM GRANULOCYTES NFR BLD AUTO: 0.4 %
LYMPHOCYTES # BLD AUTO: 1.43 K/UL
LYMPHOCYTES NFR BLD AUTO: 20.9 %
MAN DIFF?: NORMAL
MCHC RBC-ENTMCNC: 18.6 PG
MCHC RBC-ENTMCNC: 26.3 G/DL
MCV RBC AUTO: 70.6 FL
MONOCYTES # BLD AUTO: 0.53 K/UL
MONOCYTES NFR BLD AUTO: 7.7 %
NEUTROPHILS # BLD AUTO: 3.57 K/UL
NEUTROPHILS NFR BLD AUTO: 52.3 %
PLATELET # BLD AUTO: 270 K/UL
RBC # BLD: 4.46 M/UL
RBC # FLD: 19.7 %
WBC # FLD AUTO: 6.84 K/UL

## 2025-07-18 ENCOUNTER — APPOINTMENT (OUTPATIENT)
Dept: CARDIOLOGY | Facility: CLINIC | Age: 70
End: 2025-07-18
Payer: MEDICAID

## 2025-07-18 PROCEDURE — 93880 EXTRACRANIAL BILAT STUDY: CPT

## 2025-07-18 PROCEDURE — 93306 TTE W/DOPPLER COMPLETE: CPT

## 2025-07-21 ENCOUNTER — APPOINTMENT (OUTPATIENT)
Dept: CARDIOLOGY | Facility: CLINIC | Age: 70
End: 2025-07-21
Payer: MEDICARE

## 2025-07-21 PROCEDURE — 93015 CV STRESS TEST SUPVJ I&R: CPT

## 2025-07-21 PROCEDURE — 78452 HT MUSCLE IMAGE SPECT MULT: CPT

## 2025-07-21 PROCEDURE — A9502: CPT

## 2025-07-24 DIAGNOSIS — E78.5 HYPERLIPIDEMIA, UNSPECIFIED: ICD-10-CM

## 2025-07-24 DIAGNOSIS — E55.9 VITAMIN D DEFICIENCY, UNSPECIFIED: ICD-10-CM

## 2025-07-24 DIAGNOSIS — I21.4 NON-ST ELEVATION (NSTEMI) MYOCARDIAL INFARCTION: ICD-10-CM

## 2025-07-24 DIAGNOSIS — E61.1 IRON DEFICIENCY: ICD-10-CM

## 2025-07-24 DIAGNOSIS — I10 ESSENTIAL (PRIMARY) HYPERTENSION: ICD-10-CM

## 2025-07-24 DIAGNOSIS — Z95.5 PRESENCE OF CORONARY ANGIOPLASTY IMPLANT AND GRAFT: ICD-10-CM

## 2025-07-24 DIAGNOSIS — E53.8 DEFICIENCY OF OTHER SPECIFIED B GROUP VITAMINS: ICD-10-CM

## 2025-07-24 DIAGNOSIS — R06.02 SHORTNESS OF BREATH: ICD-10-CM

## 2025-07-24 DIAGNOSIS — I25.10 ATHEROSCLEROTIC HEART DISEASE OF NATIVE CORONARY ARTERY W/OUT ANGINA PECTORIS: ICD-10-CM

## 2025-07-24 DIAGNOSIS — R42 DIZZINESS AND GIDDINESS: ICD-10-CM

## 2025-07-24 RX ORDER — CHOLECALCIFEROL (VITAMIN D3) 1250 MCG
1.25 MG CAPSULE ORAL
Qty: 15 | Refills: 0 | Status: ACTIVE | COMMUNITY
Start: 2025-07-24 | End: 1900-01-01

## 2025-07-24 RX ORDER — VIT B12/INTRINSIC FACT/FOLATE 500-20-800
800-500-20 TABLET ORAL
Qty: 90 | Refills: 3 | Status: ACTIVE | COMMUNITY
Start: 2025-07-24 | End: 1900-01-01

## 2025-07-24 RX ORDER — FERROUS SULFATE TAB EC 325 MG (65 MG FE EQUIVALENT) 325 (65 FE) MG
325 (65 FE) TABLET DELAYED RESPONSE ORAL
Qty: 180 | Refills: 5 | Status: ACTIVE | COMMUNITY
Start: 2025-07-24 | End: 1900-01-01

## 2025-07-24 RX ORDER — FOLIC ACID 1 MG/1
1 TABLET ORAL
Qty: 90 | Refills: 3 | Status: DISCONTINUED | COMMUNITY
Start: 2025-07-24 | End: 2025-07-24

## 2025-07-24 RX ORDER — ERGOCALCIFEROL 1.25 MG/1
1.25 MG CAPSULE, LIQUID FILLED ORAL
Qty: 12 | Refills: 1 | Status: ACTIVE | COMMUNITY
Start: 2025-07-24 | End: 1900-01-01

## 2025-07-31 ENCOUNTER — APPOINTMENT (OUTPATIENT)
Dept: PULMONOLOGY | Facility: CLINIC | Age: 70
End: 2025-07-31